# Patient Record
Sex: FEMALE | Race: OTHER | NOT HISPANIC OR LATINO
[De-identification: names, ages, dates, MRNs, and addresses within clinical notes are randomized per-mention and may not be internally consistent; named-entity substitution may affect disease eponyms.]

---

## 2017-01-04 ENCOUNTER — APPOINTMENT (OUTPATIENT)
Dept: ANTEPARTUM | Facility: CLINIC | Age: 32
End: 2017-01-04

## 2017-01-08 ENCOUNTER — EMERGENCY (EMERGENCY)
Facility: HOSPITAL | Age: 32
LOS: 0 days | Discharge: HOME | End: 2017-01-08
Attending: EMERGENCY MEDICINE
Payer: COMMERCIAL

## 2017-01-08 VITALS
DIASTOLIC BLOOD PRESSURE: 68 MMHG | HEIGHT: 63 IN | RESPIRATION RATE: 16 BRPM | TEMPERATURE: 99 F | OXYGEN SATURATION: 98 % | WEIGHT: 154.98 LBS | HEART RATE: 99 BPM | SYSTOLIC BLOOD PRESSURE: 121 MMHG

## 2017-01-08 DIAGNOSIS — J11.1 INFLUENZA DUE TO UNIDENTIFIED INFLUENZA VIRUS WITH OTHER RESPIRATORY MANIFESTATIONS: ICD-10-CM

## 2017-01-08 DIAGNOSIS — J06.9 ACUTE UPPER RESPIRATORY INFECTION, UNSPECIFIED: ICD-10-CM

## 2017-01-08 LAB
FLUAV SPEC QL CULT: NEGATIVE — SIGNIFICANT CHANGE UP
FLUBV AG SPEC QL IA: NEGATIVE — SIGNIFICANT CHANGE UP

## 2017-01-08 PROCEDURE — 99284 EMERGENCY DEPT VISIT MOD MDM: CPT

## 2017-01-08 NOTE — ED PROVIDER NOTE - PHYSICAL EXAMINATION
GEN: Alert, NAD  HEAD: atraumatic, EOMI, normal lids/conjunctiva  ENT: normal hearing, patent oropharynx without erythema/exudate, uvula midline  NECK: +supple, no tenderness/meningismus, +Trachea midline  PULM: Bilateral BS, normal resp effort, no wheeze/stridor/retractions  CV: RRR, no M/R/G, +dist ext pulses  ABD: soft, NT/ND  BACK: no CVAT, no midline tenderness  MSK: no edema/erythema/cyanosis  SKIN: no rash  NEURO: AAOx3, no sensory/motor deficits, CN 2-12 intact GEN: Alert, NAD  HEAD: atraumatic, EOMI, normal lids/conjunctiva  ENT: normal hearing, patent oropharynx without erythema/exudate, uvula midline  NECK: +supple, no tenderness/meningismus, +Trachea midline  PULM: Bilateral BS, normal resp effort, no wheeze/stridor/retractions  CV: RRR, no M/R/G, +dist ext pulses  ABD: soft, NT/ND  BACK: no CVAT, no midline tenderness  MSK: no edema/erythema/cyanosis  SKIN: no rash  NEURO: AAOx3, no sensory/motor deficits, CN 2-12 intact   on bedside US

## 2017-01-08 NOTE — ED PROVIDER NOTE - PRESENTING SYMPTOMS DETAILS
20 wks pregnant coming in w myalgias, dry cough, rhinorroea for past 2-3 days. n oabdpain/vb. sees ob/gyn tomorrow. n osick contacts, subjective fevers  ROS: No fever/chills, No photophobia/eye pain/changes in vision, No ear pain/sore throat/dysphagia, No chest pain/palpitations, no SOBwheeze/stridor, No abdominal pain/N/V/D, no dysuria/frequency/discharge, No neck/back pain, no rash, no changes in neurological status/function.

## 2017-01-08 NOTE — ED ADULT NURSE NOTE - OBJECTIVE STATEMENT
received ft c/o nasal congestion with fever, body aches and cough x few days denies chest pain or sob at present pt 20 weeks pregnant a0

## 2017-01-08 NOTE — ED PROVIDER NOTE - MEDICAL DECISION MAKING DETAILS
flu swab neg. pt appears well and non-toxic. . VSS. Sx have improved during ED stay. No acute events during ED observation period. Precautions given to return to the ED if sx persist or change. Pt expresses understanding and has no further questions. Pt feels comfortable and wishes to be discharged home. Instructed to follow up with PMD ob in 24 hrs.

## 2017-01-09 ENCOUNTER — ASOB RESULT (OUTPATIENT)
Age: 32
End: 2017-01-09

## 2017-01-09 ENCOUNTER — APPOINTMENT (OUTPATIENT)
Dept: ANTEPARTUM | Facility: CLINIC | Age: 32
End: 2017-01-09

## 2017-01-10 ENCOUNTER — APPOINTMENT (OUTPATIENT)
Dept: ANTEPARTUM | Facility: CLINIC | Age: 32
End: 2017-01-10

## 2017-03-18 ENCOUNTER — EMERGENCY (EMERGENCY)
Facility: HOSPITAL | Age: 32
LOS: 0 days | Discharge: HOME | End: 2017-03-18
Attending: EMERGENCY MEDICINE
Payer: COMMERCIAL

## 2017-03-18 VITALS
OXYGEN SATURATION: 100 % | WEIGHT: 164.91 LBS | HEART RATE: 79 BPM | HEIGHT: 64 IN | SYSTOLIC BLOOD PRESSURE: 110 MMHG | DIASTOLIC BLOOD PRESSURE: 67 MMHG | RESPIRATION RATE: 17 BRPM | TEMPERATURE: 98 F

## 2017-03-18 VITALS
RESPIRATION RATE: 18 BRPM | HEART RATE: 74 BPM | SYSTOLIC BLOOD PRESSURE: 103 MMHG | TEMPERATURE: 97 F | DIASTOLIC BLOOD PRESSURE: 61 MMHG

## 2017-03-18 DIAGNOSIS — Z04.3 ENCOUNTER FOR EXAMINATION AND OBSERVATION FOLLOWING OTHER ACCIDENT: ICD-10-CM
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
## 2017-03-18 DIAGNOSIS — Z33.1 PREGNANT STATE, INCIDENTAL: ICD-10-CM

## 2017-03-18 DIAGNOSIS — S80.02XA CONTUSION OF LEFT KNEE, INITIAL ENCOUNTER: ICD-10-CM

## 2017-03-18 DIAGNOSIS — Z91.040 LATEX ALLERGY STATUS: ICD-10-CM

## 2017-03-18 DIAGNOSIS — Z88.8 ALLERGY STATUS TO OTHER DRUGS, MEDICAMENTS AND BIOLOGICAL SUBSTANCES STATUS: ICD-10-CM

## 2017-03-18 DIAGNOSIS — S39.012A STRAIN OF MUSCLE, FASCIA AND TENDON OF LOWER BACK, INITIAL ENCOUNTER: ICD-10-CM
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
## 2017-03-18 PROCEDURE — 76856 US EXAM PELVIC COMPLETE: CPT | Mod: 26

## 2017-03-18 PROCEDURE — 99284 EMERGENCY DEPT VISIT MOD MDM: CPT

## 2017-03-18 RX ORDER — ACETAMINOPHEN 500 MG
650 TABLET ORAL ONCE
Qty: 0 | Refills: 0 | Status: COMPLETED | OUTPATIENT
Start: 2017-03-18 | End: 2017-03-18

## 2017-03-18 RX ADMIN — Medication 650 MILLIGRAM(S): at 17:31

## 2017-03-18 NOTE — ED PROVIDER NOTE - MUSCULOSKELETAL, MLM
Spine appears normal, range of motion is not limited, left sided knee with mild contusion, ecchymosis noted, ROM wnl, able to stand and walk, lumbar region with increased muscle tone. No midline C/T/L tenderness on spine.

## 2017-03-18 NOTE — ED PROVIDER NOTE - MEDICAL DECISION MAKING DETAILS
vertical lie, HR is 140s for fetus to dc with OB follow up, if abd pain or cramps develop to see OB/GYN or go to nearest OB facility.

## 2017-03-18 NOTE — ED ADULT TRIAGE NOTE - CHIEF COMPLAINT QUOTE
" I fell downs the stairs and if I am 7 months pregnant, I fell on my back into the side I don't think I hit my belly" denies vaginal bleeding, denies abdominal pain

## 2017-03-18 NOTE — ED PROVIDER NOTE - CARE PLAN
Principal Discharge DX:	Accidental fall  Secondary Diagnosis:	Knee contusion  Secondary Diagnosis:	Lumbosacral strain

## 2017-03-18 NOTE — ED PROVIDER NOTE - OBJECTIVE STATEMENT
31 year old female otherwise healthy presenting to ED due to left knee pain after slip and fall backwards on stairs, states some back pain noted, and left sided knee pain. Denies any vaginal bleeding or abd pain.

## 2017-05-31 ENCOUNTER — INPATIENT (INPATIENT)
Facility: HOSPITAL | Age: 32
LOS: 1 days | Discharge: HOME | End: 2017-06-02
Attending: OBSTETRICS & GYNECOLOGY | Admitting: OBSTETRICS & GYNECOLOGY

## 2017-05-31 ENCOUNTER — OUTPATIENT (OUTPATIENT)
Dept: OUTPATIENT SERVICES | Facility: HOSPITAL | Age: 32
LOS: 1 days | End: 2017-05-31
Payer: COMMERCIAL

## 2017-05-31 VITALS — WEIGHT: 176.37 LBS | HEIGHT: 63 IN

## 2017-05-31 DIAGNOSIS — O26.90 PREGNANCY RELATED CONDITIONS, UNSPECIFIED, UNSPECIFIED TRIMESTER: ICD-10-CM

## 2017-05-31 DIAGNOSIS — Z3A.00 WEEKS OF GESTATION OF PREGNANCY NOT SPECIFIED: ICD-10-CM

## 2017-05-31 DIAGNOSIS — O26.899 OTHER SPECIFIED PREGNANCY RELATED CONDITIONS, UNSPECIFIED TRIMESTER: ICD-10-CM

## 2017-05-31 LAB
BASOPHILS # BLD AUTO: 0.01 K/UL — SIGNIFICANT CHANGE UP (ref 0–0.2)
BASOPHILS NFR BLD AUTO: 0.1 % — SIGNIFICANT CHANGE UP (ref 0–2)
BLD GP AB SCN SERPL QL: NEGATIVE — SIGNIFICANT CHANGE UP
EOSINOPHIL # BLD AUTO: 0 K/UL — SIGNIFICANT CHANGE UP (ref 0–0.5)
EOSINOPHIL NFR BLD AUTO: 0 % — SIGNIFICANT CHANGE UP (ref 0–6)
HCT VFR BLD CALC: 36.8 % — SIGNIFICANT CHANGE UP (ref 34.5–45)
HGB BLD-MCNC: 12.3 G/DL — SIGNIFICANT CHANGE UP (ref 11.5–15.5)
IMM GRANULOCYTES NFR BLD AUTO: 0.3 % — SIGNIFICANT CHANGE UP (ref 0–1.5)
LYMPHOCYTES # BLD AUTO: 0.85 K/UL — LOW (ref 1–3.3)
LYMPHOCYTES # BLD AUTO: 6.8 % — LOW (ref 13–44)
MCHC RBC-ENTMCNC: 30.5 PG — SIGNIFICANT CHANGE UP (ref 27–34)
MCHC RBC-ENTMCNC: 33.4 % — SIGNIFICANT CHANGE UP (ref 32–36)
MCV RBC AUTO: 91.3 FL — SIGNIFICANT CHANGE UP (ref 80–100)
MONOCYTES # BLD AUTO: 0.22 K/UL — SIGNIFICANT CHANGE UP (ref 0–0.9)
MONOCYTES NFR BLD AUTO: 1.8 % — LOW (ref 2–14)
NEUTROPHILS # BLD AUTO: 11.44 K/UL — HIGH (ref 1.8–7.4)
NEUTROPHILS NFR BLD AUTO: 91 % — HIGH (ref 43–77)
PLATELET # BLD AUTO: 179 K/UL — SIGNIFICANT CHANGE UP (ref 150–400)
PMV BLD: 12.3 FL — SIGNIFICANT CHANGE UP (ref 7–13)
RBC # BLD: 4.03 M/UL — SIGNIFICANT CHANGE UP (ref 3.8–5.2)
RBC # FLD: 13.5 % — SIGNIFICANT CHANGE UP (ref 10.3–14.5)
RH IG SCN BLD-IMP: POSITIVE — SIGNIFICANT CHANGE UP
RH IG SCN BLD-IMP: POSITIVE — SIGNIFICANT CHANGE UP
T PALLIDUM AB TITR SER: NEGATIVE — SIGNIFICANT CHANGE UP
WBC # BLD: 12.56 K/UL — HIGH (ref 3.8–10.5)
WBC # FLD AUTO: 12.56 K/UL — HIGH (ref 3.8–10.5)

## 2017-05-31 PROCEDURE — 76818 FETAL BIOPHYS PROFILE W/NST: CPT | Mod: 26

## 2017-05-31 PROCEDURE — 99214 OFFICE O/P EST MOD 30 MIN: CPT | Mod: 25

## 2017-05-31 RX ORDER — LANOLIN
1 OINTMENT (GRAM) TOPICAL EVERY 6 HOURS
Qty: 0 | Refills: 0 | Status: DISCONTINUED | OUTPATIENT
Start: 2017-05-31 | End: 2017-06-02

## 2017-05-31 RX ORDER — AER TRAVELER 0.5 G/1
1 SOLUTION RECTAL; TOPICAL EVERY 4 HOURS
Qty: 0 | Refills: 0 | Status: DISCONTINUED | OUTPATIENT
Start: 2017-05-31 | End: 2017-06-02

## 2017-05-31 RX ORDER — PENICILLIN G POTASSIUM 5000000 [IU]/1
2.5 POWDER, FOR SOLUTION INTRAMUSCULAR; INTRAPLEURAL; INTRATHECAL; INTRAVENOUS EVERY 4 HOURS
Qty: 0 | Refills: 0 | Status: DISCONTINUED | OUTPATIENT
Start: 2017-05-31 | End: 2017-05-31

## 2017-05-31 RX ORDER — DOCUSATE SODIUM 100 MG
100 CAPSULE ORAL
Qty: 0 | Refills: 0 | Status: DISCONTINUED | OUTPATIENT
Start: 2017-05-31 | End: 2017-06-02

## 2017-05-31 RX ORDER — SODIUM CHLORIDE 9 MG/ML
1000 INJECTION, SOLUTION INTRAVENOUS
Qty: 0 | Refills: 0 | Status: DISCONTINUED | OUTPATIENT
Start: 2017-05-31 | End: 2017-05-31

## 2017-05-31 RX ORDER — ACETAMINOPHEN 500 MG
975 TABLET ORAL EVERY 6 HOURS
Qty: 0 | Refills: 0 | Status: COMPLETED | OUTPATIENT
Start: 2017-05-31 | End: 2018-04-29

## 2017-05-31 RX ORDER — DIBUCAINE 1 %
1 OINTMENT (GRAM) RECTAL EVERY 4 HOURS
Qty: 0 | Refills: 0 | Status: DISCONTINUED | OUTPATIENT
Start: 2017-05-31 | End: 2017-05-31

## 2017-05-31 RX ORDER — SODIUM CHLORIDE 9 MG/ML
1000 INJECTION, SOLUTION INTRAVENOUS ONCE
Qty: 0 | Refills: 0 | Status: COMPLETED | OUTPATIENT
Start: 2017-05-31 | End: 2017-05-31

## 2017-05-31 RX ORDER — OXYTOCIN 10 UNIT/ML
2 VIAL (ML) INJECTION
Qty: 30 | Refills: 0 | Status: DISCONTINUED | OUTPATIENT
Start: 2017-05-31 | End: 2017-05-31

## 2017-05-31 RX ORDER — ACETAMINOPHEN 500 MG
975 TABLET ORAL EVERY 6 HOURS
Qty: 0 | Refills: 0 | Status: DISCONTINUED | OUTPATIENT
Start: 2017-05-31 | End: 2017-06-02

## 2017-05-31 RX ORDER — HYDROCORTISONE 1 %
1 OINTMENT (GRAM) TOPICAL EVERY 4 HOURS
Qty: 0 | Refills: 0 | Status: DISCONTINUED | OUTPATIENT
Start: 2017-05-31 | End: 2017-05-31

## 2017-05-31 RX ORDER — MAGNESIUM HYDROXIDE 400 MG/1
30 TABLET, CHEWABLE ORAL
Qty: 0 | Refills: 0 | Status: DISCONTINUED | OUTPATIENT
Start: 2017-05-31 | End: 2017-06-02

## 2017-05-31 RX ORDER — PRAMOXINE HYDROCHLORIDE 150 MG/15G
1 AEROSOL, FOAM RECTAL EVERY 4 HOURS
Qty: 0 | Refills: 0 | Status: DISCONTINUED | OUTPATIENT
Start: 2017-05-31 | End: 2017-05-31

## 2017-05-31 RX ORDER — DIPHENHYDRAMINE HCL 50 MG
25 CAPSULE ORAL EVERY 6 HOURS
Qty: 0 | Refills: 0 | Status: DISCONTINUED | OUTPATIENT
Start: 2017-05-31 | End: 2017-06-02

## 2017-05-31 RX ORDER — SIMETHICONE 80 MG/1
80 TABLET, CHEWABLE ORAL EVERY 6 HOURS
Qty: 0 | Refills: 0 | Status: DISCONTINUED | OUTPATIENT
Start: 2017-05-31 | End: 2017-06-02

## 2017-05-31 RX ORDER — TETANUS TOXOID, REDUCED DIPHTHERIA TOXOID AND ACELLULAR PERTUSSIS VACCINE, ADSORBED 5; 2.5; 8; 8; 2.5 [IU]/.5ML; [IU]/.5ML; UG/.5ML; UG/.5ML; UG/.5ML
0.5 SUSPENSION INTRAMUSCULAR ONCE
Qty: 0 | Refills: 0 | Status: DISCONTINUED | OUTPATIENT
Start: 2017-05-31 | End: 2017-06-02

## 2017-05-31 RX ORDER — OXYCODONE HYDROCHLORIDE 5 MG/1
5 TABLET ORAL
Qty: 0 | Refills: 0 | Status: DISCONTINUED | OUTPATIENT
Start: 2017-05-31 | End: 2017-06-02

## 2017-05-31 RX ORDER — IBUPROFEN 200 MG
600 TABLET ORAL EVERY 6 HOURS
Qty: 0 | Refills: 0 | Status: COMPLETED | OUTPATIENT
Start: 2017-05-31 | End: 2018-04-29

## 2017-05-31 RX ORDER — KETOROLAC TROMETHAMINE 30 MG/ML
30 SYRINGE (ML) INJECTION ONCE
Qty: 0 | Refills: 0 | Status: DISCONTINUED | OUTPATIENT
Start: 2017-05-31 | End: 2017-05-31

## 2017-05-31 RX ORDER — ONDANSETRON 8 MG/1
4 TABLET, FILM COATED ORAL ONCE
Qty: 0 | Refills: 0 | Status: COMPLETED | OUTPATIENT
Start: 2017-05-31 | End: 2017-05-31

## 2017-05-31 RX ORDER — SODIUM CHLORIDE 9 MG/ML
3 INJECTION INTRAMUSCULAR; INTRAVENOUS; SUBCUTANEOUS EVERY 8 HOURS
Qty: 0 | Refills: 0 | Status: DISCONTINUED | OUTPATIENT
Start: 2017-05-31 | End: 2017-06-02

## 2017-05-31 RX ORDER — IBUPROFEN 200 MG
600 TABLET ORAL EVERY 6 HOURS
Qty: 0 | Refills: 0 | Status: DISCONTINUED | OUTPATIENT
Start: 2017-05-31 | End: 2017-06-02

## 2017-05-31 RX ORDER — PENICILLIN G POTASSIUM 5000000 [IU]/1
POWDER, FOR SOLUTION INTRAMUSCULAR; INTRAPLEURAL; INTRATHECAL; INTRAVENOUS
Qty: 0 | Refills: 0 | Status: DISCONTINUED | OUTPATIENT
Start: 2017-05-31 | End: 2017-05-31

## 2017-05-31 RX ORDER — SODIUM CHLORIDE 9 MG/ML
3 INJECTION INTRAMUSCULAR; INTRAVENOUS; SUBCUTANEOUS EVERY 8 HOURS
Qty: 0 | Refills: 0 | Status: DISCONTINUED | OUTPATIENT
Start: 2017-05-31 | End: 2017-05-31

## 2017-05-31 RX ORDER — GLYCERIN ADULT
1 SUPPOSITORY, RECTAL RECTAL AT BEDTIME
Qty: 0 | Refills: 0 | Status: DISCONTINUED | OUTPATIENT
Start: 2017-05-31 | End: 2017-06-02

## 2017-05-31 RX ORDER — PENICILLIN G POTASSIUM 5000000 [IU]/1
5 POWDER, FOR SOLUTION INTRAMUSCULAR; INTRAPLEURAL; INTRATHECAL; INTRAVENOUS ONCE
Qty: 0 | Refills: 0 | Status: COMPLETED | OUTPATIENT
Start: 2017-05-31 | End: 2017-05-31

## 2017-05-31 RX ORDER — OXYTOCIN 10 UNIT/ML
41.67 VIAL (ML) INJECTION
Qty: 20 | Refills: 0 | Status: DISCONTINUED | OUTPATIENT
Start: 2017-05-31 | End: 2017-05-31

## 2017-05-31 RX ORDER — OXYTOCIN 10 UNIT/ML
333.33 VIAL (ML) INJECTION
Qty: 20 | Refills: 0 | Status: DISCONTINUED | OUTPATIENT
Start: 2017-05-31 | End: 2017-05-31

## 2017-05-31 RX ORDER — HYDROCORTISONE 1 %
1 OINTMENT (GRAM) TOPICAL EVERY 4 HOURS
Qty: 0 | Refills: 0 | Status: DISCONTINUED | OUTPATIENT
Start: 2017-05-31 | End: 2017-06-02

## 2017-05-31 RX ORDER — DIBUCAINE 1 %
1 OINTMENT (GRAM) RECTAL EVERY 4 HOURS
Qty: 0 | Refills: 0 | Status: DISCONTINUED | OUTPATIENT
Start: 2017-05-31 | End: 2017-06-02

## 2017-05-31 RX ORDER — AER TRAVELER 0.5 G/1
1 SOLUTION RECTAL; TOPICAL EVERY 4 HOURS
Qty: 0 | Refills: 0 | Status: DISCONTINUED | OUTPATIENT
Start: 2017-05-31 | End: 2017-05-31

## 2017-05-31 RX ORDER — OXYCODONE HYDROCHLORIDE 5 MG/1
5 TABLET ORAL EVERY 4 HOURS
Qty: 0 | Refills: 0 | Status: DISCONTINUED | OUTPATIENT
Start: 2017-05-31 | End: 2017-06-02

## 2017-05-31 RX ORDER — PRAMOXINE HYDROCHLORIDE 150 MG/15G
1 AEROSOL, FOAM RECTAL EVERY 4 HOURS
Qty: 0 | Refills: 0 | Status: DISCONTINUED | OUTPATIENT
Start: 2017-05-31 | End: 2017-06-02

## 2017-05-31 RX ADMIN — PENICILLIN G POTASSIUM 200 MILLION UNIT(S): 5000000 POWDER, FOR SOLUTION INTRAMUSCULAR; INTRAPLEURAL; INTRATHECAL; INTRAVENOUS at 13:25

## 2017-05-31 RX ADMIN — PENICILLIN G POTASSIUM 200 MILLION UNIT(S): 5000000 POWDER, FOR SOLUTION INTRAMUSCULAR; INTRAPLEURAL; INTRATHECAL; INTRAVENOUS at 09:09

## 2017-05-31 RX ADMIN — Medication 0.2 MILLIGRAM(S): at 17:47

## 2017-05-31 RX ADMIN — Medication 125 MILLIUNIT(S)/MIN: at 17:49

## 2017-05-31 RX ADMIN — Medication 600 MILLIGRAM(S): at 22:45

## 2017-05-31 RX ADMIN — SODIUM CHLORIDE 125 MILLILITER(S): 9 INJECTION, SOLUTION INTRAVENOUS at 09:08

## 2017-05-31 RX ADMIN — Medication 30 MILLIGRAM(S): at 17:50

## 2017-05-31 RX ADMIN — SODIUM CHLORIDE 125 MILLILITER(S): 9 INJECTION, SOLUTION INTRAVENOUS at 17:07

## 2017-05-31 RX ADMIN — Medication 2 MILLIUNIT(S)/MIN: at 16:30

## 2017-05-31 RX ADMIN — Medication 30 MILLIGRAM(S): at 18:00

## 2017-05-31 RX ADMIN — SODIUM CHLORIDE 2000 MILLILITER(S): 9 INJECTION, SOLUTION INTRAVENOUS at 11:35

## 2017-05-31 RX ADMIN — Medication 600 MILLIGRAM(S): at 23:20

## 2017-05-31 RX ADMIN — ONDANSETRON 4 MILLIGRAM(S): 8 TABLET, FILM COATED ORAL at 17:50

## 2017-06-01 RX ADMIN — Medication 600 MILLIGRAM(S): at 12:39

## 2017-06-01 RX ADMIN — SODIUM CHLORIDE 3 MILLILITER(S): 9 INJECTION INTRAMUSCULAR; INTRAVENOUS; SUBCUTANEOUS at 05:14

## 2017-06-01 RX ADMIN — Medication 1 TABLET(S): at 12:39

## 2017-06-01 RX ADMIN — Medication 975 MILLIGRAM(S): at 22:38

## 2017-06-01 RX ADMIN — Medication 975 MILLIGRAM(S): at 02:44

## 2017-06-01 RX ADMIN — SODIUM CHLORIDE 3 MILLILITER(S): 9 INJECTION INTRAMUSCULAR; INTRAVENOUS; SUBCUTANEOUS at 00:41

## 2017-06-01 RX ADMIN — Medication 600 MILLIGRAM(S): at 22:38

## 2017-06-01 RX ADMIN — Medication 1 APPLICATION(S): at 22:37

## 2017-06-01 RX ADMIN — Medication 600 MILLIGRAM(S): at 18:02

## 2017-06-01 RX ADMIN — Medication 975 MILLIGRAM(S): at 09:53

## 2017-06-01 RX ADMIN — Medication 600 MILLIGRAM(S): at 18:34

## 2017-06-01 RX ADMIN — Medication 975 MILLIGRAM(S): at 10:15

## 2017-06-01 RX ADMIN — Medication 975 MILLIGRAM(S): at 23:00

## 2017-06-01 RX ADMIN — AER TRAVELER 1 APPLICATION(S): 0.5 SOLUTION RECTAL; TOPICAL at 22:38

## 2017-06-01 RX ADMIN — Medication 975 MILLIGRAM(S): at 15:00

## 2017-06-01 RX ADMIN — Medication 975 MILLIGRAM(S): at 14:30

## 2017-06-01 RX ADMIN — Medication 600 MILLIGRAM(S): at 13:27

## 2017-06-01 RX ADMIN — PRAMOXINE HYDROCHLORIDE 1 APPLICATION(S): 150 AEROSOL, FOAM RECTAL at 22:37

## 2017-06-01 RX ADMIN — Medication 975 MILLIGRAM(S): at 03:20

## 2017-06-01 RX ADMIN — Medication 600 MILLIGRAM(S): at 05:09

## 2017-06-01 RX ADMIN — Medication 600 MILLIGRAM(S): at 23:00

## 2017-06-01 RX ADMIN — Medication 600 MILLIGRAM(S): at 05:45

## 2017-06-01 NOTE — DISCHARGE NOTE OB - CARE PROVIDER_API CALL
Tom Osman), Obstetrics and Gynecology  [address] [place] [address]  Phone: [phone]  Fax: [phone]

## 2017-06-01 NOTE — PROVIDER CONTACT NOTE (OTHER) - ASSESSMENT
pt. OOB without difficulty. No c/o dizziness or lightheadedness.  Fundus firm, lochia is WNL.
Pt. verbalizes understanding.  No complaints offered at this time.  Continue to assess.

## 2017-06-01 NOTE — PROGRESS NOTE ADULT - SUBJECTIVE AND OBJECTIVE BOX
INTERVAL HPI/OVERNIGHT EVENTS: Pt seen and examined at bedside.  Pt complains of mild perineal discomfort. Voiding and ambulating without difficulty. Denies dizziness, lightheadedness, CP, SOB, palpitations.     MEDICATIONS  (STANDING):  sodium chloride 0.9% lock flush 3milliLiter(s) IV Push every 8 hours  diphtheria/tetanus/pertussis (acellular) Vaccine (ADAcel) 0.5milliLiter(s) IntraMuscular once  prenatal multivitamin 1Tablet(s) Oral daily  oxyCODONE IR 5milliGRAM(s) Oral every 3 hours  ibuprofen  Tablet 600milliGRAM(s) Oral every 6 hours  acetaminophen   Tablet. 975milliGRAM(s) Oral every 6 hours    MEDICATIONS  (PRN):  dibucaine 1% Ointment 1Application(s) Topical every 4 hours PRN Perineal Discomfort  lanolin Ointment 1Application(s) Topical every 6 hours PRN Sore Nipples  witch hazel Pads 1Application(s) Topical every 4 hours PRN Perineal Discomfort  simethicone 80milliGRAM(s) Chew every 6 hours PRN Gas  diphenhydrAMINE   Capsule 25milliGRAM(s) Oral every 6 hours PRN Itching  glycerin Suppository - Adult 1Suppository(s) Rectal at bedtime PRN Constipation  magnesium hydroxide Suspension 30milliLiter(s) Oral two times a day PRN Constipation  docusate sodium 100milliGRAM(s) Oral two times a day PRN Stool Softening  oxyCODONE IR 5milliGRAM(s) Oral every 4 hours PRN Severe Pain (7 -10)  hydrocortisone 1% Cream 1Application(s) Topical every 4 hours PRN perineal discomfort  pramoxine 1%/zinc 5% Cream 1Application(s) Topical every 4 hours PRN perineal discomfort      Vital Signs Last 24 Hrs  T(C): 36.6, Max: 37.3 (06-01 @ 01:37)  T(F): 97.9, Max: 99.1 (06-01 @ 01:37)  HR: 76 (69 - 85)  BP: 93/49 (93/49 - 106/49)  BP(mean): --  RR: 18 (16 - 19)  SpO2: 98% (97% - 100%)    PHYSICAL EXAM:    GA: NAD, A+0 x 3  Abd: ( + ) BS, soft, nontender, nondistended, no rebound or guarding,   Uterus: Fundus midline; firm    LABS:                        12.3   12.56 )-----------( 179      ( 31 May 2017 08:40 )             36.8                   RADIOLOGY & ADDITIONAL TESTS:

## 2017-06-01 NOTE — DISCHARGE NOTE OB - MEDICATION SUMMARY - MEDICATIONS TO TAKE
I will START or STAY ON the medications listed below when I get home from the hospital:    Tylenol 325 mg oral tablet  -- 2 tab(s) by mouth every 6 hours, As Needed for Pain  -- Indication: For Pain    Motrin  mg oral tablet  -- 2 tab(s) by mouth every 6 hours, As Needed for pain  -- Indication: For Pain

## 2017-06-01 NOTE — DISCHARGE NOTE OB - PLAN OF CARE
Return to usual daily living activities Regular Diet  Follow up in the office in 6 weeks. Call [phone] for an appointment.

## 2017-06-01 NOTE — PROGRESS NOTE ADULT - SUBJECTIVE AND OBJECTIVE BOX
AMPARO CONWAY   MRN# [id number]    SUBJECTIVE:    Pain: Controlled  Complaints: None    MILESTONES:   Alert and oriented x 3  [X  ]Out of bed /ambulating [ X ]  Voiding [X  ]  Due to void [  ]  Tolerating a regular diet [ X  ]  Infant feeding:   Breast [ x ]   Bottle [  ]     Both [  ]                         Feeding related issues or concerns: Reports of cluster feeding, increased fatigue. Educated patient     Objective   T(C): 36.6, Max: 37.3 (- @ 01:37)  HR: 76 (69 - 85)  BP: 93/49 (93/49 - 106/49)   Sue ESCOBEDO, notified recent blood pressure range 87/44, 87/53  RR: 18 (16 - 19)  SpO2: 98% (97% - 100%)    LABS:              12.3   12.56 )-----------( 179      ( 31 May 2017 08:40 )             36.8       Blood Type: A Positive  Treponema Pallidum Antibody Interpretation: Negative ( @ 08:40)    ASSESSMENT:   31y  y/o G  P     PPDay # 1    Delivery:   [X ]  Assisted delivery  :    Vacuum   [  ]   Forceps)  [  ]  Indication for assisted delivery: Tracing Abn [  ]     Maternal Exhaustion [  ]     Other [  ]  PAST MEDICAL & SURGICAL HISTORY:  No pertinent past medical history  No significant past surgical history    Current Issues:   Breasts: Soft [ X ]    Engorged [  ]  Abdomen: Soft [ X ]  Nontender [X  ]  Nondistended [ x ]   Distended [  ]                     Fundus firm [X  ]    Fundus boggy [   ]   Bowel sounds present [  ]  Bowel sounds absent  []   Vagina: Lochia  Mod [ x ]   Scant [  ]  Heavy [  ]  Perineum:  Intact [  ]   Laceration   [  ]   Type of laceration _____________,   Episiotomy [ x ]    Type of episiotomy _median______________  Lower extremities: Edema[ neg ]    Noted bilaterally [  ]       Non-tender calves [  ]      Negative Macrina's sign [  ]    Positive pedal pulses [  ]  Other relevant physical exam findings:      PLAN:  Plan: Increase ambulation, analgesia PRN and pain medication protocal standing oxycodone, ibuprofen and acetaminophen. Will notify  of recent blood pressure range. Patient notified there is a possibility of repeat CBC if  believes it is warranted  Diet: Continue regular diet  Continue routine postpartum care.     Discharge Planning [ x ]  Consults :  Social Work [  ]     Lactation [  ]    Other [   ] AMPARO CONWAY   MRN# [id number]    SUBJECTIVE:    Pain: Controlled  Complaints: None    MILESTONES:   Alert and oriented x 3  [X  ]Out of bed /ambulating [ X ]  Voiding [X  ]  Due to void [  ]  Tolerating a regular diet [ X  ]  Infant feeding:   Breast [ x ]   Bottle [  ]     Both [  ]                         Feeding related issues or concerns: Reports of cluster feeding, increased fatigue. Educated patient     Objective   T(C): 36.6, Max: 37.3 (- @ 01:37)  HR: 76 (69 - 85)  BP: 93/49 (93/49 - 106/49)   Sue ESCOBEDO, notified recent blood pressure range 87/44, 87/53  RR: 18 (16 - 19)  SpO2: 98% (97% - 100%)    LABS:              12.3   12.56 )-----------( 179      ( 31 May 2017 08:40 )             36.8       Blood Type: A Positive  Treponema Pallidum Antibody Interpretation: Negative ( @ 08:40)    ASSESSMENT:   31y  y/o G  P     PPDay # 1    Delivery:   [X ]  Assisted delivery  :    Vacuum   [x  ]   Forceps)  [  ]  Indication for assisted delivery: Tracing Abn [  ]     Maternal Exhaustion [  ]     Other [  ]  s/p Cytotec per rectum and methergine  PAST MEDICAL & SURGICAL HISTORY:  No pertinent past medical history    Current Issues:   Breasts: Soft [ X ]    Engorged [  ]  Abdomen: Soft [ X ]  Nontender [X  ]  Nondistended [ x ]   Distended [  ]                     Fundus firm [X  ]    Fundus boggy [   ]   Bowel sounds present [  ]  Bowel sounds absent  []   Vagina: Lochia  Mod [ x ]   Scant [  ]  Heavy [  ]  Perineum:  Intact [  ]   Laceration   [  ]   Type of laceration _____________,   Episiotomy [ x ]    Type of episiotomy _median______________  Lower extremities: Edema[ neg ]    Noted bilaterally [  ]       Non-tender calves [  ]      Negative Macrina's sign [  ]    Positive pedal pulses [  ]  Other relevant physical exam findings:      PLAN:  Plan: Increase ambulation, analgesia PRN and pain medication protocal standing oxycodone, ibuprofen and acetaminophen. Will notify  of recent blood pressure range. Patient notified there is a possibility of repeat CBC if  believes it is warranted  Diet: Continue regular diet  Continue routine postpartum care.     Discharge Planning [ x ]  Consults :  Social Work [  ]     Lactation [  ]    Other [   ]

## 2017-06-01 NOTE — LACTATION INITIAL EVALUATION - INTERVENTION OUTCOME
nbn demonstrated  deep latch and  performed  with sucking and swallowing  noted   pt instructed to notify pediatrician if nbn not feeding 8-12 times/24 hours and not voiding and stooling according to breastfeeding log.  ,/verbalizes understanding

## 2017-06-01 NOTE — LACTATION INITIAL EVALUATION - LACTATION INTERVENTIONS
initiate skin to skin/assisted with deep latch and positioning  discussed  signs  of  effective  feeding and  swallowing.  discussed  compression at  breast when  nbn  stops  drinking  and  is  still sucking.   ,  discussed  strategies  to  keep  nbn  awake/initiate hand expression routine

## 2017-06-01 NOTE — PROVIDER CONTACT NOTE (OTHER) - SITUATION
Pt's diastolic bp outside of parameter, <50, HR in the 70's. Pt. denies any HA, chest pain, blurry vision, epigastric pain, palpitations, or SOB.  Moderate vaginal bleeding noted.

## 2017-06-01 NOTE — DISCHARGE NOTE OB - CARE PLAN
Principal Discharge DX:	Vaginal delivery  Goal:	Return to usual daily living activities  Instructions for follow-up, activity and diet:	Regular Diet  Follow up in the office in 6 weeks. Call [phone] for an appointment. Principal Discharge DX:	Vaginal delivery  Goal:	Return to usual daily living activities  Instructions for follow-up, activity and diet:	Regular Diet  Follow up in the office in 6 weeks. Call [phone] for an appointment. Principal Discharge DX:	Vaginal delivery  Goal:	Return to usual daily living activities  Instructions for follow-up, activity and diet:	Regular Diet  Follow up in the office in 6 weeks. Call [phone] for an appointment.

## 2017-06-01 NOTE — PROGRESS NOTE ADULT - SUBJECTIVE AND OBJECTIVE BOX
Patient seen and examined at bedside, no acute overnight events. No acute complaints, pain well controlled.  Patient is ambulating, voiding spontaneously, and tolerating regular diet. Patient has not yet passed flatus. Pt is breastfeeding her baby.    Vital Signs Last 24 Hours  T(C): 36.6, Max: 37.3 ( @ 01:37)  HR: 76 (69 - 85)  BP: 93/49 (93/49 - 106/49)  RR: 18 (16 - 19)  SpO2: 98% (97% - 100%)  Wt(kg): --    Physical Exam:  General: NAD  Abdomen: Soft, non-tender, non-distended, fundus firm  Pelvic: Lochia wnl  Ext: NTBL  Labs:  Blood type: A Positive  Rubella IgG: RPR: Negative                          12.3   12.56<H> >-----------< 179    (  @ 08:40 )             36.8      AP 32 yo  PPD1 sp uncomplicated VAVD meeting all appropriate milestones  Increase ambulation  analgesia as needed  routine care

## 2017-06-01 NOTE — PROVIDER CONTACT NOTE (OTHER) - BACKGROUND
Zoë Tipton notified and made aware at that time. pt instructed to increase po hydration. Pt. instructed to notify RN of any changes or appearance of symptoms in above status.

## 2017-06-01 NOTE — DISCHARGE NOTE OB - PATIENT PORTAL LINK FT
“You can access the Follow[employer] Patient Portal, offered by [facility], by registering with the following website: [email or web address]”

## 2017-06-01 NOTE — PROVIDER CONTACT NOTE (OTHER) - ACTION/TREATMENT ORDERED:
Kim Dumont NP notified of low BP's and in to see pt.  Continue to observe and instructed pt. not to get OOB if dizzy or lightheaded.  pt. verbalized understanding.

## 2017-06-01 NOTE — DISCHARGE NOTE OB - MATERIALS PROVIDED
Shaken Baby Prevention Handout/Vaccinations/Birth Certificate Instructions/[facility] Hearing Screen Program/Breastfeeding Log/[facility]  Screening Program/Guide to Postpartum Care

## 2017-06-02 VITALS
TEMPERATURE: 98 F | OXYGEN SATURATION: 100 % | HEART RATE: 61 BPM | RESPIRATION RATE: 18 BRPM | DIASTOLIC BLOOD PRESSURE: 57 MMHG | SYSTOLIC BLOOD PRESSURE: 100 MMHG

## 2017-06-02 DIAGNOSIS — O26.899 OTHER SPECIFIED PREGNANCY RELATED CONDITIONS, UNSPECIFIED TRIMESTER: ICD-10-CM

## 2017-06-02 RX ADMIN — Medication 975 MILLIGRAM(S): at 05:00

## 2017-06-02 RX ADMIN — Medication 975 MILLIGRAM(S): at 04:20

## 2017-06-02 RX ADMIN — Medication 600 MILLIGRAM(S): at 05:00

## 2017-06-02 RX ADMIN — Medication 600 MILLIGRAM(S): at 04:20

## 2018-05-24 ENCOUNTER — APPOINTMENT (OUTPATIENT)
Dept: INTERNAL MEDICINE | Facility: CLINIC | Age: 33
End: 2018-05-24

## 2018-07-21 ENCOUNTER — EMERGENCY (EMERGENCY)
Facility: HOSPITAL | Age: 33
LOS: 0 days | Discharge: HOME | End: 2018-07-21
Attending: STUDENT IN AN ORGANIZED HEALTH CARE EDUCATION/TRAINING PROGRAM
Payer: COMMERCIAL

## 2018-07-21 VITALS
SYSTOLIC BLOOD PRESSURE: 103 MMHG | TEMPERATURE: 99 F | HEART RATE: 100 BPM | DIASTOLIC BLOOD PRESSURE: 64 MMHG | OXYGEN SATURATION: 100 % | RESPIRATION RATE: 18 BRPM

## 2018-07-21 VITALS
RESPIRATION RATE: 19 BRPM | TEMPERATURE: 99 F | SYSTOLIC BLOOD PRESSURE: 114 MMHG | OXYGEN SATURATION: 95 % | DIASTOLIC BLOOD PRESSURE: 59 MMHG | HEART RATE: 101 BPM | HEIGHT: 63 IN | WEIGHT: 141.98 LBS

## 2018-07-21 DIAGNOSIS — R05 COUGH: ICD-10-CM

## 2018-07-21 DIAGNOSIS — R10.30 LOWER ABDOMINAL PAIN, UNSPECIFIED: ICD-10-CM

## 2018-07-21 DIAGNOSIS — R50.9 FEVER, UNSPECIFIED: ICD-10-CM

## 2018-07-21 DIAGNOSIS — Z3A.01 LESS THAN 8 WEEKS GESTATION OF PREGNANCY: ICD-10-CM

## 2018-07-21 DIAGNOSIS — O23.41 UNSPECIFIED INFECTION OF URINARY TRACT IN PREGNANCY, FIRST TRIMESTER: ICD-10-CM

## 2018-07-21 LAB
ALBUMIN SERPL ELPH-MCNC: 3.6 G/DL — SIGNIFICANT CHANGE UP (ref 3.3–5)
ALP SERPL-CCNC: 44 U/L — SIGNIFICANT CHANGE UP (ref 40–120)
ALT FLD-CCNC: 17 U/L — SIGNIFICANT CHANGE UP (ref 12–78)
AMYLASE P1 CFR SERPL: 29 U/L — SIGNIFICANT CHANGE UP (ref 25–115)
ANION GAP SERPL CALC-SCNC: 9 MMOL/L — SIGNIFICANT CHANGE UP (ref 5–17)
APPEARANCE UR: CLEAR — SIGNIFICANT CHANGE UP
AST SERPL-CCNC: 10 U/L — LOW (ref 15–37)
BACTERIA # UR AUTO: ABNORMAL
BASOPHILS # BLD AUTO: 0.04 K/UL — SIGNIFICANT CHANGE UP (ref 0–0.2)
BASOPHILS NFR BLD AUTO: 0.3 % — SIGNIFICANT CHANGE UP (ref 0–2)
BILIRUB DIRECT SERPL-MCNC: 0.15 MG/DL — SIGNIFICANT CHANGE UP (ref 0.05–0.2)
BILIRUB INDIRECT FLD-MCNC: 0.4 MG/DL — SIGNIFICANT CHANGE UP (ref 0.2–1)
BILIRUB SERPL-MCNC: 0.6 MG/DL — SIGNIFICANT CHANGE UP (ref 0.2–1.2)
BILIRUB UR-MCNC: NEGATIVE — SIGNIFICANT CHANGE UP
BUN SERPL-MCNC: 7 MG/DL — SIGNIFICANT CHANGE UP (ref 7–23)
CALCIUM SERPL-MCNC: 8.7 MG/DL — SIGNIFICANT CHANGE UP (ref 8.5–10.1)
CHLORIDE SERPL-SCNC: 109 MMOL/L — HIGH (ref 96–108)
CO2 SERPL-SCNC: 23 MMOL/L — SIGNIFICANT CHANGE UP (ref 22–31)
COLOR SPEC: YELLOW — SIGNIFICANT CHANGE UP
CREAT SERPL-MCNC: 0.67 MG/DL — SIGNIFICANT CHANGE UP (ref 0.5–1.3)
DIFF PNL FLD: NEGATIVE — SIGNIFICANT CHANGE UP
EOSINOPHIL # BLD AUTO: 0.01 K/UL — SIGNIFICANT CHANGE UP (ref 0–0.5)
EOSINOPHIL NFR BLD AUTO: 0.1 % — SIGNIFICANT CHANGE UP (ref 0–6)
EPI CELLS # UR: ABNORMAL
GLUCOSE SERPL-MCNC: 103 MG/DL — HIGH (ref 70–99)
GLUCOSE UR QL: NEGATIVE MG/DL — SIGNIFICANT CHANGE UP
HCG SERPL-ACNC: 445 MIU/ML — HIGH
HCT VFR BLD CALC: 38.6 % — SIGNIFICANT CHANGE UP (ref 34.5–45)
HGB BLD-MCNC: 13.1 G/DL — SIGNIFICANT CHANGE UP (ref 11.5–15.5)
IMM GRANULOCYTES NFR BLD AUTO: 0.5 % — SIGNIFICANT CHANGE UP (ref 0–1.5)
KETONES UR-MCNC: NEGATIVE — SIGNIFICANT CHANGE UP
LEUKOCYTE ESTERASE UR-ACNC: ABNORMAL
LIDOCAIN IGE QN: 121 U/L — SIGNIFICANT CHANGE UP (ref 73–393)
LYMPHOCYTES # BLD AUTO: 0.72 K/UL — LOW (ref 1–3.3)
LYMPHOCYTES # BLD AUTO: 4.9 % — LOW (ref 13–44)
MCHC RBC-ENTMCNC: 31.2 PG — SIGNIFICANT CHANGE UP (ref 27–34)
MCHC RBC-ENTMCNC: 33.9 GM/DL — SIGNIFICANT CHANGE UP (ref 32–36)
MCV RBC AUTO: 91.9 FL — SIGNIFICANT CHANGE UP (ref 80–100)
MONOCYTES # BLD AUTO: 0.57 K/UL — SIGNIFICANT CHANGE UP (ref 0–0.9)
MONOCYTES NFR BLD AUTO: 3.9 % — SIGNIFICANT CHANGE UP (ref 2–14)
NEUTROPHILS # BLD AUTO: 13.39 K/UL — HIGH (ref 1.8–7.4)
NEUTROPHILS NFR BLD AUTO: 90.3 % — HIGH (ref 43–77)
NITRITE UR-MCNC: NEGATIVE — SIGNIFICANT CHANGE UP
NRBC # BLD: 0 /100 WBCS — SIGNIFICANT CHANGE UP (ref 0–0)
PH UR: 6 — SIGNIFICANT CHANGE UP (ref 5–8)
PLATELET # BLD AUTO: 176 K/UL — SIGNIFICANT CHANGE UP (ref 150–400)
POTASSIUM SERPL-MCNC: 3.6 MMOL/L — SIGNIFICANT CHANGE UP (ref 3.5–5.3)
POTASSIUM SERPL-SCNC: 3.6 MMOL/L — SIGNIFICANT CHANGE UP (ref 3.5–5.3)
PROT SERPL-MCNC: 7.3 GM/DL — SIGNIFICANT CHANGE UP (ref 6–8.3)
PROT UR-MCNC: 15 MG/DL
RBC # BLD: 4.2 M/UL — SIGNIFICANT CHANGE UP (ref 3.8–5.2)
RBC # FLD: 12.2 % — SIGNIFICANT CHANGE UP (ref 10.3–14.5)
RBC CASTS # UR COMP ASSIST: SIGNIFICANT CHANGE UP /HPF (ref 0–4)
SODIUM SERPL-SCNC: 141 MMOL/L — SIGNIFICANT CHANGE UP (ref 135–145)
SP GR SPEC: 1 — LOW (ref 1.01–1.02)
UROBILINOGEN FLD QL: NEGATIVE MG/DL — SIGNIFICANT CHANGE UP
WBC # BLD: 14.8 K/UL — HIGH (ref 3.8–10.5)
WBC # FLD AUTO: 14.8 K/UL — HIGH (ref 3.8–10.5)
WBC UR QL: ABNORMAL

## 2018-07-21 PROCEDURE — 76856 US EXAM PELVIC COMPLETE: CPT | Mod: 26

## 2018-07-21 PROCEDURE — 99284 EMERGENCY DEPT VISIT MOD MDM: CPT | Mod: 25

## 2018-07-21 RX ORDER — SODIUM CHLORIDE 9 MG/ML
1000 INJECTION INTRAMUSCULAR; INTRAVENOUS; SUBCUTANEOUS ONCE
Qty: 0 | Refills: 0 | Status: COMPLETED | OUTPATIENT
Start: 2018-07-21 | End: 2018-07-21

## 2018-07-21 RX ORDER — NITROFURANTOIN MACROCRYSTAL 50 MG
100 CAPSULE ORAL ONCE
Qty: 0 | Refills: 0 | Status: COMPLETED | OUTPATIENT
Start: 2018-07-21 | End: 2018-07-21

## 2018-07-21 RX ORDER — NITROFURANTOIN MACROCRYSTAL 50 MG
1 CAPSULE ORAL
Qty: 14 | Refills: 0
Start: 2018-07-21 | End: 2018-07-27

## 2018-07-21 RX ORDER — ACETAMINOPHEN 500 MG
650 TABLET ORAL ONCE
Qty: 0 | Refills: 0 | Status: COMPLETED | OUTPATIENT
Start: 2018-07-21 | End: 2018-07-21

## 2018-07-21 RX ADMIN — SODIUM CHLORIDE 1000 MILLILITER(S): 9 INJECTION INTRAMUSCULAR; INTRAVENOUS; SUBCUTANEOUS at 09:03

## 2018-07-21 RX ADMIN — Medication 100 MILLIGRAM(S): at 11:34

## 2018-07-21 RX ADMIN — Medication 650 MILLIGRAM(S): at 11:34

## 2018-07-21 RX ADMIN — SODIUM CHLORIDE 1000 MILLILITER(S): 9 INJECTION INTRAMUSCULAR; INTRAVENOUS; SUBCUTANEOUS at 11:34

## 2018-07-21 RX ADMIN — Medication 650 MILLIGRAM(S): at 12:05

## 2018-07-21 NOTE — ED ADULT TRIAGE NOTE - CHIEF COMPLAINT QUOTE
patient c/o of fever with cough and sore throat started yesterday , patient c/o of abdominal cramping , denied vaginal discharge no vaginal bleeding patient pregnant

## 2018-07-21 NOTE — ED PROVIDER NOTE - OBJECTIVE STATEMENT
33 year old female presents today c/o fever of 101 this morning with mild cough (+) sore throat (-) ear pain (-) nausea or vomiting +lower abdominal cramping, pt states that she is pregnant but denies vaginal bleeding (LMP-7/27/18)

## 2018-07-21 NOTE — ED PROVIDER NOTE - PROGRESS NOTE DETAILS
pt signed out to me from dr gaitan, pt presented with hematuria and chest pain, pt has a h/o hemorrhoidal bleed and unsure if she had rectal bleeding, guaiac is pending guaiac is negative, ua shows a uti, rocephin ordered pt feels better, pt and her daughter who is at the bedside were asked if she was ever told to have a pericardial effusion, they are not aware however, she does have a cardiologist who her daughter will have her follow up with, her daughter will fax the ct report to her cardiologist on monday, the pt denies sob, does not appear in distress

## 2018-07-21 NOTE — ED PROVIDER NOTE - MEDICAL DECISION MAKING DETAILS
labs, ivf, sono, tylenol, macrobid labs, ivf, sono, tylenol, macrobid, pt told to follow up with her gynecologist next week for repeat hormone level and sono

## 2018-07-21 NOTE — ED PROVIDER NOTE - CARE PLAN
Principal Discharge DX:	UTI (urinary tract infection) during pregnancy  Secondary Diagnosis:	Pregnancy at early stage

## 2018-07-22 LAB
CULTURE RESULTS: SIGNIFICANT CHANGE UP
SPECIMEN SOURCE: SIGNIFICANT CHANGE UP

## 2018-07-24 ENCOUNTER — EMERGENCY (EMERGENCY)
Facility: HOSPITAL | Age: 33
LOS: 0 days | Discharge: HOME | End: 2018-07-24
Attending: EMERGENCY MEDICINE
Payer: COMMERCIAL

## 2018-07-24 VITALS
OXYGEN SATURATION: 99 % | HEART RATE: 103 BPM | HEIGHT: 63 IN | WEIGHT: 143.08 LBS | DIASTOLIC BLOOD PRESSURE: 61 MMHG | RESPIRATION RATE: 19 BRPM | SYSTOLIC BLOOD PRESSURE: 130 MMHG | TEMPERATURE: 99 F

## 2018-07-24 VITALS
RESPIRATION RATE: 20 BRPM | HEART RATE: 92 BPM | SYSTOLIC BLOOD PRESSURE: 105 MMHG | TEMPERATURE: 99 F | DIASTOLIC BLOOD PRESSURE: 57 MMHG | OXYGEN SATURATION: 100 %

## 2018-07-24 DIAGNOSIS — Z91.040 LATEX ALLERGY STATUS: ICD-10-CM

## 2018-07-24 DIAGNOSIS — O99.511 DISEASES OF THE RESPIRATORY SYSTEM COMPLICATING PREGNANCY, FIRST TRIMESTER: ICD-10-CM

## 2018-07-24 DIAGNOSIS — R50.9 FEVER, UNSPECIFIED: ICD-10-CM

## 2018-07-24 DIAGNOSIS — R51 HEADACHE: ICD-10-CM

## 2018-07-24 DIAGNOSIS — J32.9 CHRONIC SINUSITIS, UNSPECIFIED: ICD-10-CM

## 2018-07-24 DIAGNOSIS — Z88.9 ALLERGY STATUS TO UNSPECIFIED DRUGS, MEDICAMENTS AND BIOLOGICAL SUBSTANCES: ICD-10-CM

## 2018-07-24 DIAGNOSIS — R09.81 NASAL CONGESTION: ICD-10-CM

## 2018-07-24 LAB
ALBUMIN SERPL ELPH-MCNC: 3.5 G/DL — SIGNIFICANT CHANGE UP (ref 3.3–5)
ALP SERPL-CCNC: 47 U/L — SIGNIFICANT CHANGE UP (ref 40–120)
ALT FLD-CCNC: 22 U/L — SIGNIFICANT CHANGE UP (ref 12–78)
ANION GAP SERPL CALC-SCNC: 9 MMOL/L — SIGNIFICANT CHANGE UP (ref 5–17)
APPEARANCE UR: CLEAR — SIGNIFICANT CHANGE UP
AST SERPL-CCNC: 15 U/L — SIGNIFICANT CHANGE UP (ref 15–37)
BASOPHILS # BLD AUTO: 0.04 K/UL — SIGNIFICANT CHANGE UP (ref 0–0.2)
BASOPHILS NFR BLD AUTO: 0.3 % — SIGNIFICANT CHANGE UP (ref 0–2)
BILIRUB SERPL-MCNC: 0.5 MG/DL — SIGNIFICANT CHANGE UP (ref 0.2–1.2)
BILIRUB UR-MCNC: NEGATIVE — SIGNIFICANT CHANGE UP
BLD GP AB SCN SERPL QL: SIGNIFICANT CHANGE UP
BUN SERPL-MCNC: 4 MG/DL — LOW (ref 7–23)
CALCIUM SERPL-MCNC: 9 MG/DL — SIGNIFICANT CHANGE UP (ref 8.5–10.1)
CHLORIDE SERPL-SCNC: 104 MMOL/L — SIGNIFICANT CHANGE UP (ref 96–108)
CO2 SERPL-SCNC: 28 MMOL/L — SIGNIFICANT CHANGE UP (ref 22–31)
COLOR SPEC: YELLOW — SIGNIFICANT CHANGE UP
CREAT SERPL-MCNC: 0.58 MG/DL — SIGNIFICANT CHANGE UP (ref 0.5–1.3)
DIFF PNL FLD: NEGATIVE — SIGNIFICANT CHANGE UP
EOSINOPHIL # BLD AUTO: 0.09 K/UL — SIGNIFICANT CHANGE UP (ref 0–0.5)
EOSINOPHIL NFR BLD AUTO: 0.8 % — SIGNIFICANT CHANGE UP (ref 0–6)
EPI CELLS # UR: SIGNIFICANT CHANGE UP
GLUCOSE SERPL-MCNC: 93 MG/DL — SIGNIFICANT CHANGE UP (ref 70–99)
GLUCOSE UR QL: NEGATIVE MG/DL — SIGNIFICANT CHANGE UP
HCG SERPL-ACNC: 1967 MIU/ML — HIGH
HCT VFR BLD CALC: 38.7 % — SIGNIFICANT CHANGE UP (ref 34.5–45)
HGB BLD-MCNC: 13.3 G/DL — SIGNIFICANT CHANGE UP (ref 11.5–15.5)
IMM GRANULOCYTES NFR BLD AUTO: 0.4 % — SIGNIFICANT CHANGE UP (ref 0–1.5)
INR BLD: 1.17 RATIO — HIGH (ref 0.88–1.16)
KETONES UR-MCNC: ABNORMAL
LEUKOCYTE ESTERASE UR-ACNC: NEGATIVE — SIGNIFICANT CHANGE UP
LYMPHOCYTES # BLD AUTO: 1.23 K/UL — SIGNIFICANT CHANGE UP (ref 1–3.3)
LYMPHOCYTES # BLD AUTO: 10.5 % — LOW (ref 13–44)
MCHC RBC-ENTMCNC: 31.2 PG — SIGNIFICANT CHANGE UP (ref 27–34)
MCHC RBC-ENTMCNC: 34.4 GM/DL — SIGNIFICANT CHANGE UP (ref 32–36)
MCV RBC AUTO: 90.8 FL — SIGNIFICANT CHANGE UP (ref 80–100)
MONOCYTES # BLD AUTO: 0.67 K/UL — SIGNIFICANT CHANGE UP (ref 0–0.9)
MONOCYTES NFR BLD AUTO: 5.7 % — SIGNIFICANT CHANGE UP (ref 2–14)
NEUTROPHILS # BLD AUTO: 9.59 K/UL — HIGH (ref 1.8–7.4)
NEUTROPHILS NFR BLD AUTO: 82.3 % — HIGH (ref 43–77)
NITRITE UR-MCNC: NEGATIVE — SIGNIFICANT CHANGE UP
NRBC # BLD: 0 /100 WBCS — SIGNIFICANT CHANGE UP (ref 0–0)
PH UR: 7 — SIGNIFICANT CHANGE UP (ref 5–8)
PLATELET # BLD AUTO: 220 K/UL — SIGNIFICANT CHANGE UP (ref 150–400)
POTASSIUM SERPL-MCNC: 3.9 MMOL/L — SIGNIFICANT CHANGE UP (ref 3.5–5.3)
POTASSIUM SERPL-SCNC: 3.9 MMOL/L — SIGNIFICANT CHANGE UP (ref 3.5–5.3)
PROT SERPL-MCNC: 7.8 GM/DL — SIGNIFICANT CHANGE UP (ref 6–8.3)
PROT UR-MCNC: NEGATIVE MG/DL — SIGNIFICANT CHANGE UP
PROTHROM AB SERPL-ACNC: 12.8 SEC — HIGH (ref 9.8–12.7)
RBC # BLD: 4.26 M/UL — SIGNIFICANT CHANGE UP (ref 3.8–5.2)
RBC # FLD: 12 % — SIGNIFICANT CHANGE UP (ref 10.3–14.5)
SODIUM SERPL-SCNC: 141 MMOL/L — SIGNIFICANT CHANGE UP (ref 135–145)
SP GR SPEC: 1 — LOW (ref 1.01–1.02)
UROBILINOGEN FLD QL: NEGATIVE MG/DL — SIGNIFICANT CHANGE UP
WBC # BLD: 11.67 K/UL — HIGH (ref 3.8–10.5)
WBC # FLD AUTO: 11.67 K/UL — HIGH (ref 3.8–10.5)
WBC UR QL: SIGNIFICANT CHANGE UP

## 2018-07-24 PROCEDURE — 99284 EMERGENCY DEPT VISIT MOD MDM: CPT

## 2018-07-24 RX ORDER — SODIUM CHLORIDE 9 MG/ML
1000 INJECTION INTRAMUSCULAR; INTRAVENOUS; SUBCUTANEOUS ONCE
Qty: 0 | Refills: 0 | Status: COMPLETED | OUTPATIENT
Start: 2018-07-24 | End: 2018-07-24

## 2018-07-24 RX ORDER — ACETAMINOPHEN 500 MG
975 TABLET ORAL ONCE
Qty: 0 | Refills: 0 | Status: COMPLETED | OUTPATIENT
Start: 2018-07-24 | End: 2018-07-24

## 2018-07-24 RX ORDER — METOCLOPRAMIDE HCL 10 MG
10 TABLET ORAL ONCE
Qty: 0 | Refills: 0 | Status: COMPLETED | OUTPATIENT
Start: 2018-07-24 | End: 2018-07-24

## 2018-07-24 RX ADMIN — Medication 975 MILLIGRAM(S): at 17:25

## 2018-07-24 RX ADMIN — SODIUM CHLORIDE 1000 MILLILITER(S): 9 INJECTION INTRAMUSCULAR; INTRAVENOUS; SUBCUTANEOUS at 19:17

## 2018-07-24 RX ADMIN — SODIUM CHLORIDE 1000 MILLILITER(S): 9 INJECTION INTRAMUSCULAR; INTRAVENOUS; SUBCUTANEOUS at 17:25

## 2018-07-24 RX ADMIN — Medication 10 MILLIGRAM(S): at 17:25

## 2018-07-24 NOTE — ED PROVIDER NOTE - OBJECTIVE STATEMENT
33 years old male here with her  c/o severe nasal congestions, headache, chills, fever, sore throat for 4 days. Pt sts she is pregnant and last menstrual cycle was 18 . Pt was here 3 days ago was diagnosed with uti and discharged with macrobid. Pt denies recent hx of trauma, dizziness, blurred visions, light sensitivities, diplopia, focal/distal weakness or numbness, cough, sob, chest pain, nausea, vomiting, abd pain, dysuria, hematuria, vaginal spotting or discharge or irregular bowel movements.

## 2018-07-24 NOTE — ED PROVIDER NOTE - CONSTITUTIONAL, MLM
normal... Well appearing, well nourished, awake, alert, oriented to person, place, time/situation and in no apparent distress. Speaking in clear full sentences no nasal flaring no shoulders retractions not holding her head/chest/abdomen, appears comfortable sitting up in the stretcher in a bright light room.

## 2018-07-24 NOTE — ED PROVIDER NOTE - PROGRESS NOTE DETAILS
Pt is alert and oriented x 3 smiling while sitting up tolerating dinner at bed side. Pt sts her headache is completely resolved. Pt is ambulating with normal gaits without assists. Pt and her  are given and explained all test reports and advised to follow up with her ob/gyn and continue to finish the her course of oral antibiotic. Pt's beta hormone level is more than doubled compare to three days ago.

## 2019-03-28 ENCOUNTER — ASOB RESULT (OUTPATIENT)
Age: 34
End: 2019-03-28

## 2019-03-28 ENCOUNTER — APPOINTMENT (OUTPATIENT)
Dept: ANTEPARTUM | Facility: CLINIC | Age: 34
End: 2019-03-28
Payer: COMMERCIAL

## 2019-03-28 PROCEDURE — 76813 OB US NUCHAL MEAS 1 GEST: CPT

## 2019-03-28 PROCEDURE — 36416 COLLJ CAPILLARY BLOOD SPEC: CPT

## 2019-03-28 PROCEDURE — 76801 OB US < 14 WKS SINGLE FETUS: CPT

## 2019-05-28 ENCOUNTER — ASOB RESULT (OUTPATIENT)
Age: 34
End: 2019-05-28

## 2019-05-28 ENCOUNTER — APPOINTMENT (OUTPATIENT)
Dept: ANTEPARTUM | Facility: CLINIC | Age: 34
End: 2019-05-28
Payer: COMMERCIAL

## 2019-05-28 PROCEDURE — 76811 OB US DETAILED SNGL FETUS: CPT

## 2019-06-06 NOTE — DISCHARGE NOTE OB - USE WARM WATER SITZ BATH FOR RELIEF OF DISCOMFORT
[de-identified] : This is a 75-year-old patient of with a history of atrial fibrillation which is being treated with the Coumadin therapy. In addition he has a history of hypertension and cardiac arrhythmias. In addition he has a history of prostatic carcinoma and of concern is this progressive slow rise in his PSA level he did see Dr. Martinez who reassured him about this rise and he will be seeing him shortly. Statement Selected

## 2019-07-23 ENCOUNTER — ASOB RESULT (OUTPATIENT)
Age: 34
End: 2019-07-23

## 2019-07-23 ENCOUNTER — APPOINTMENT (OUTPATIENT)
Dept: ANTEPARTUM | Facility: CLINIC | Age: 34
End: 2019-07-23
Payer: COMMERCIAL

## 2019-07-23 PROCEDURE — 76817 TRANSVAGINAL US OBSTETRIC: CPT

## 2019-07-23 PROCEDURE — 76819 FETAL BIOPHYS PROFIL W/O NST: CPT

## 2019-07-23 PROCEDURE — 76816 OB US FOLLOW-UP PER FETUS: CPT

## 2020-06-25 ENCOUNTER — ASOB RESULT (OUTPATIENT)
Age: 35
End: 2020-06-25

## 2020-06-25 ENCOUNTER — TRANSCRIPTION ENCOUNTER (OUTPATIENT)
Age: 35
End: 2020-06-25

## 2020-06-25 ENCOUNTER — APPOINTMENT (OUTPATIENT)
Dept: ANTEPARTUM | Facility: CLINIC | Age: 35
End: 2020-06-25
Payer: COMMERCIAL

## 2020-06-25 PROCEDURE — 99204 OFFICE O/P NEW MOD 45 MIN: CPT | Mod: 95

## 2020-07-10 ENCOUNTER — ASOB RESULT (OUTPATIENT)
Age: 35
End: 2020-07-10

## 2020-07-10 ENCOUNTER — APPOINTMENT (OUTPATIENT)
Dept: ANTEPARTUM | Facility: CLINIC | Age: 35
End: 2020-07-10
Payer: COMMERCIAL

## 2020-07-10 PROCEDURE — 36416 COLLJ CAPILLARY BLOOD SPEC: CPT

## 2020-07-10 PROCEDURE — 76813 OB US NUCHAL MEAS 1 GEST: CPT

## 2020-07-10 PROCEDURE — 76801 OB US < 14 WKS SINGLE FETUS: CPT

## 2020-09-09 ENCOUNTER — APPOINTMENT (OUTPATIENT)
Dept: ANTEPARTUM | Facility: CLINIC | Age: 35
End: 2020-09-09
Payer: COMMERCIAL

## 2020-09-09 ENCOUNTER — ASOB RESULT (OUTPATIENT)
Age: 35
End: 2020-09-09

## 2020-09-09 PROCEDURE — 76811 OB US DETAILED SNGL FETUS: CPT

## 2020-10-16 ENCOUNTER — EMERGENCY (EMERGENCY)
Facility: HOSPITAL | Age: 35
LOS: 1 days | Discharge: HOME | End: 2020-10-16
Attending: STUDENT IN AN ORGANIZED HEALTH CARE EDUCATION/TRAINING PROGRAM | Admitting: STUDENT IN AN ORGANIZED HEALTH CARE EDUCATION/TRAINING PROGRAM
Payer: COMMERCIAL

## 2020-10-16 VITALS
SYSTOLIC BLOOD PRESSURE: 103 MMHG | OXYGEN SATURATION: 100 % | RESPIRATION RATE: 17 BRPM | DIASTOLIC BLOOD PRESSURE: 62 MMHG | HEART RATE: 69 BPM

## 2020-10-16 VITALS
DIASTOLIC BLOOD PRESSURE: 63 MMHG | HEART RATE: 85 BPM | TEMPERATURE: 98 F | RESPIRATION RATE: 18 BRPM | SYSTOLIC BLOOD PRESSURE: 114 MMHG | OXYGEN SATURATION: 99 % | HEIGHT: 63 IN

## 2020-10-16 PROCEDURE — 73660 X-RAY EXAM OF TOE(S): CPT | Mod: 26,LT

## 2020-10-16 PROCEDURE — 99283 EMERGENCY DEPT VISIT LOW MDM: CPT

## 2020-10-16 RX ORDER — ACETAMINOPHEN 500 MG
650 TABLET ORAL ONCE
Refills: 0 | Status: COMPLETED | OUTPATIENT
Start: 2020-10-16 | End: 2020-10-16

## 2020-10-16 RX ADMIN — Medication 650 MILLIGRAM(S): at 09:23

## 2020-10-16 NOTE — ED PROVIDER NOTE - PROGRESS NOTE DETAILS
Quinn Hines MD: paged OB for nst Quinn Hines MD: ob didn't receive prior pages - called resident, she will set up NST Quinn Hines MD: d/w OB - NST wnl will dc w/ close outpt f/u

## 2020-10-16 NOTE — ED PROVIDER NOTE - NSFOLLOWUPINSTRUCTIONS_ED_ALL_ED_FT
Follow up with your OBGYN and a pcp within the next 3-5 days.     Please take ACETAMINOPHEN and IBUPROFEN as directed for your pain.

## 2020-10-16 NOTE — ED PROVIDER NOTE - CLINICAL SUMMARY MEDICAL DECISION MAKING FREE TEXT BOX
35F w/ L big toe pain, w/out constitutional symptoms - unlikely septic arthritis, possible gout vs other inflammatory condition, will rule out fracture w/ x-ray, check FS r/o hyperglycemia, give sx relief, patient not eager for joint aspiration at this point, made shared decision to follow up in a few days to see if pain improves - will d/w ob for NST given >24 wks pregnant

## 2020-10-16 NOTE — ED ADULT TRIAGE NOTE - CHIEF COMPLAINT QUOTE
Pt c/o swelling and pain to left foot. Pt states she had left calf pain last week and was ruled out for DVT. Pt is 6 months pregnant, denies any pregnancy-related complaints.

## 2020-10-16 NOTE — ED ADULT NURSE NOTE - OBJECTIVE STATEMENT
Pt 6 months pregnant c/o Lt leg/ankle swelling for the past 2 days, pt had ultrasound performed which ruled out a dvt in her Lt leg, denies sob, area is tender to touch and warm, medicated as ordered, will continue to monitor.

## 2020-10-16 NOTE — ED ADULT TRIAGE NOTE - SPO2 (%)
Becky MURRAY Just  [address]  [place] [address]      9/19/2017    Dear Becky     We have made several attempts to contact you by telephone, but have been unable to do so.       Please call the clinic at your earliest convenience to: Discuss scheduling colonoscopy       Sincerely,     MAYRA Silverio   Gastroenterology  [address] [place]  [address] [phone]   
99

## 2020-10-16 NOTE — CHART NOTE - NSCHARTNOTEFT_GEN_A_CORE
R4 OB Chart Note    Pt is 35y  at 27w who p/w L great toe pain and no OB complaints.    NST reviewed: cat 1, +accels, no decels, moderate variability.    d/w Dr. Andrew Smith MD PGY4

## 2020-10-16 NOTE — ED PROVIDER NOTE - PATIENT PORTAL LINK FT
You can access the FollowMyHealth Patient Portal offered by [facility] by registering at the following website: [email or web address]. By joining [employer]’s FollowMyHealth portal, you will also be able to view your health information using other applications (apps) compatible with our system.

## 2020-10-16 NOTE — ED PROVIDER NOTE - OBJECTIVE STATEMENT
35F  @27 wks pregnant - p/w L great toe pain and swelling since yesterday, worsened through last night but was worse this morning so decided to come to ED. Patient notes that the pain is worse when she stands or puts pressure on it, took tylenol 1:30am last night w/ mild improvement. Denies any other sx. No fever, cough, sob, n/v/d/c, etc.     OB: Jak Osman

## 2020-10-16 NOTE — ED PROVIDER NOTE - PHYSICAL EXAMINATION
*GEN:   comfortable, in no acute distress, AOx3  *EYES:   pupils equally round and reactive to light, extra-occular movements intact  *HEENT:   airway patent  *CV:   regular rate and rhythm  *RESP:   clear to auscultation bilaterally, non-labored  *ABD:   soft, non-tender  *:   no cva/flank tenderness  *EXTREM:   L big toe mild swelling distal-proximal, TTP intact ROM but limited 2/2 pain, no erythema, no calf pain / swelling, otherwise exam normal, good pulses  *SKIN:   dry, intact  *NEURO:   AOx3, no focal weakness or loss of sensation

## 2020-12-30 ENCOUNTER — APPOINTMENT (OUTPATIENT)
Dept: ANTEPARTUM | Facility: HOSPITAL | Age: 35
End: 2020-12-30

## 2020-12-30 ENCOUNTER — ASOB RESULT (OUTPATIENT)
Age: 35
End: 2020-12-30

## 2020-12-30 ENCOUNTER — APPOINTMENT (OUTPATIENT)
Dept: ANTEPARTUM | Facility: CLINIC | Age: 35
End: 2020-12-30
Payer: COMMERCIAL

## 2020-12-30 PROCEDURE — 76816 OB US FOLLOW-UP PER FETUS: CPT

## 2020-12-30 PROCEDURE — 99072 ADDL SUPL MATRL&STAF TM PHE: CPT

## 2020-12-30 PROCEDURE — 76819 FETAL BIOPHYS PROFIL W/O NST: CPT

## 2021-01-08 ENCOUNTER — LABORATORY RESULT (OUTPATIENT)
Age: 36
End: 2021-01-08

## 2021-01-08 ENCOUNTER — APPOINTMENT (OUTPATIENT)
Dept: DISASTER EMERGENCY | Facility: CLINIC | Age: 36
End: 2021-01-08

## 2021-01-11 ENCOUNTER — INPATIENT (INPATIENT)
Facility: HOSPITAL | Age: 36
LOS: 1 days | Discharge: HOME | End: 2021-01-13
Attending: OBSTETRICS & GYNECOLOGY | Admitting: OBSTETRICS & GYNECOLOGY

## 2021-01-11 ENCOUNTER — TRANSCRIPTION ENCOUNTER (OUTPATIENT)
Age: 36
End: 2021-01-11

## 2021-01-11 VITALS — HEART RATE: 76 BPM | SYSTOLIC BLOOD PRESSURE: 105 MMHG | DIASTOLIC BLOOD PRESSURE: 59 MMHG

## 2021-01-11 DIAGNOSIS — Z86.018 PERSONAL HISTORY OF OTHER BENIGN NEOPLASM: Chronic | ICD-10-CM

## 2021-01-11 DIAGNOSIS — Z86.73 PERSONAL HISTORY OF TRANSIENT ISCHEMIC ATTACK (TIA), AND CEREBRAL INFARCTION WITHOUT RESIDUAL DEFICITS: ICD-10-CM

## 2021-01-11 DIAGNOSIS — O40.3XX0 POLYHYDRAMNIOS, THIRD TRIMESTER, NOT APPLICABLE OR UNSPECIFIED: ICD-10-CM

## 2021-01-11 DIAGNOSIS — O40.9XX0 POLYHYDRAMNIOS, UNSPECIFIED TRIMESTER, NOT APPLICABLE OR UNSPECIFIED: ICD-10-CM

## 2021-01-11 LAB
ALBUMIN SERPL ELPH-MCNC: 3.7 G/DL — SIGNIFICANT CHANGE UP (ref 3.3–5)
ALP SERPL-CCNC: 256 U/L — HIGH (ref 40–120)
ALT FLD-CCNC: 14 U/L — SIGNIFICANT CHANGE UP (ref 4–33)
ANION GAP SERPL CALC-SCNC: 10 MMOL/L — SIGNIFICANT CHANGE UP (ref 7–14)
APTT BLD: 24.9 SEC — LOW (ref 27–36.3)
AST SERPL-CCNC: 15 U/L — SIGNIFICANT CHANGE UP (ref 4–32)
BASOPHILS # BLD AUTO: 0.02 K/UL — SIGNIFICANT CHANGE UP (ref 0–0.2)
BASOPHILS NFR BLD AUTO: 0.2 % — SIGNIFICANT CHANGE UP (ref 0–2)
BILIRUB SERPL-MCNC: <0.2 MG/DL — SIGNIFICANT CHANGE UP (ref 0.2–1.2)
BLD GP AB SCN SERPL QL: NEGATIVE — SIGNIFICANT CHANGE UP
BUN SERPL-MCNC: 7 MG/DL — SIGNIFICANT CHANGE UP (ref 7–23)
CALCIUM SERPL-MCNC: 9.5 MG/DL — SIGNIFICANT CHANGE UP (ref 8.4–10.5)
CHLORIDE SERPL-SCNC: 102 MMOL/L — SIGNIFICANT CHANGE UP (ref 98–107)
CO2 SERPL-SCNC: 24 MMOL/L — SIGNIFICANT CHANGE UP (ref 22–31)
CREAT SERPL-MCNC: 0.63 MG/DL — SIGNIFICANT CHANGE UP (ref 0.5–1.3)
EOSINOPHIL # BLD AUTO: 0.07 K/UL — SIGNIFICANT CHANGE UP (ref 0–0.5)
EOSINOPHIL NFR BLD AUTO: 0.7 % — SIGNIFICANT CHANGE UP (ref 0–6)
FIBRINOGEN PPP-MCNC: 725 MG/DL — HIGH (ref 290–520)
GLUCOSE SERPL-MCNC: 99 MG/DL — SIGNIFICANT CHANGE UP (ref 70–99)
HCT VFR BLD CALC: 37.2 % — SIGNIFICANT CHANGE UP (ref 34.5–45)
HGB BLD-MCNC: 12.4 G/DL — SIGNIFICANT CHANGE UP (ref 11.5–15.5)
IANC: 7.4 K/UL — SIGNIFICANT CHANGE UP (ref 1.5–8.5)
IMM GRANULOCYTES NFR BLD AUTO: 0.5 % — SIGNIFICANT CHANGE UP (ref 0–1.5)
INR BLD: 0.96 RATIO — SIGNIFICANT CHANGE UP (ref 0.88–1.16)
LYMPHOCYTES # BLD AUTO: 1.56 K/UL — SIGNIFICANT CHANGE UP (ref 1–3.3)
LYMPHOCYTES # BLD AUTO: 16.1 % — SIGNIFICANT CHANGE UP (ref 13–44)
MCHC RBC-ENTMCNC: 31.5 PG — SIGNIFICANT CHANGE UP (ref 27–34)
MCHC RBC-ENTMCNC: 33.3 GM/DL — SIGNIFICANT CHANGE UP (ref 32–36)
MCV RBC AUTO: 94.4 FL — SIGNIFICANT CHANGE UP (ref 80–100)
MONOCYTES # BLD AUTO: 0.56 K/UL — SIGNIFICANT CHANGE UP (ref 0–0.9)
MONOCYTES NFR BLD AUTO: 5.8 % — SIGNIFICANT CHANGE UP (ref 2–14)
NEUTROPHILS # BLD AUTO: 7.4 K/UL — SIGNIFICANT CHANGE UP (ref 1.8–7.4)
NEUTROPHILS NFR BLD AUTO: 76.7 % — SIGNIFICANT CHANGE UP (ref 43–77)
NRBC # BLD: 0 /100 WBCS — SIGNIFICANT CHANGE UP
NRBC # FLD: 0 K/UL — SIGNIFICANT CHANGE UP
PLATELET # BLD AUTO: 209 K/UL — SIGNIFICANT CHANGE UP (ref 150–400)
POTASSIUM SERPL-MCNC: 3.7 MMOL/L — SIGNIFICANT CHANGE UP (ref 3.5–5.3)
POTASSIUM SERPL-SCNC: 3.7 MMOL/L — SIGNIFICANT CHANGE UP (ref 3.5–5.3)
PROT SERPL-MCNC: 6.6 G/DL — SIGNIFICANT CHANGE UP (ref 6–8.3)
PROTHROM AB SERPL-ACNC: 11.1 SEC — SIGNIFICANT CHANGE UP (ref 10.6–13.6)
RBC # BLD: 3.94 M/UL — SIGNIFICANT CHANGE UP (ref 3.8–5.2)
RBC # FLD: 13 % — SIGNIFICANT CHANGE UP (ref 10.3–14.5)
RH IG SCN BLD-IMP: POSITIVE — SIGNIFICANT CHANGE UP
SODIUM SERPL-SCNC: 136 MMOL/L — SIGNIFICANT CHANGE UP (ref 135–145)
WBC # BLD: 9.66 K/UL — SIGNIFICANT CHANGE UP (ref 3.8–10.5)
WBC # FLD AUTO: 9.66 K/UL — SIGNIFICANT CHANGE UP (ref 3.8–10.5)

## 2021-01-11 RX ORDER — OXYTOCIN 10 UNIT/ML
2 VIAL (ML) INJECTION
Qty: 30 | Refills: 0 | Status: DISCONTINUED | OUTPATIENT
Start: 2021-01-11 | End: 2021-01-12

## 2021-01-11 RX ORDER — SODIUM CHLORIDE 9 MG/ML
1000 INJECTION, SOLUTION INTRAVENOUS
Refills: 0 | Status: DISCONTINUED | OUTPATIENT
Start: 2021-01-11 | End: 2021-01-12

## 2021-01-11 RX ORDER — OXYTOCIN 10 UNIT/ML
6 VIAL (ML) INJECTION
Qty: 30 | Refills: 0 | Status: DISCONTINUED | OUTPATIENT
Start: 2021-01-11 | End: 2021-01-11

## 2021-01-11 RX ORDER — OXYTOCIN 10 UNIT/ML
333.33 VIAL (ML) INJECTION
Qty: 20 | Refills: 0 | Status: DISCONTINUED | OUTPATIENT
Start: 2021-01-11 | End: 2021-01-12

## 2021-01-11 RX ADMIN — SODIUM CHLORIDE 125 MILLILITER(S): 9 INJECTION, SOLUTION INTRAVENOUS at 15:45

## 2021-01-11 NOTE — OB PROVIDER LABOR PROGRESS NOTE - NS_SUBJECTIVE/OBJECTIVE_OBGYN_ALL_OB_FT
R1 OB Labor Note    S: Patient seen and examined at bedside to place VC    T(C): 36.9 (01-11-21 @ 20:48), Max: 36.9 (01-11-21 @ 20:48)  HR: 68 (01-11-21 @ 22:05) (66 - 80)  BP: 105/56 (01-11-21 @ 20:43) (101/69 - 114/62)  BP(mean): --  ABP: --  ABP(mean): --  RR: 17 (01-11-21 @ 16:32) (17 - 18)  SpO2: 99% (01-11-21 @ 22:05) (97% - 100%)  Wt(kg): --  CVP(mm Hg): --  CI: --  CAPILLARY BLOOD GLUCOSE       N/A      01-11 @ 07:01  -  01-11 @ 22:10  --------------------------------------------------------  IN:    Lactated Ringers: 500 mL  Total IN: 500 mL    OUT:  Total OUT: 0 mL    Total NET: 500 mL

## 2021-01-11 NOTE — OB PROVIDER H&P - HISTORY OF PRESENT ILLNESS
34 y/o  EDC 21 @39+6w presents for scheduled IOL for hx of neurological event in previous pregnancy (suspected TIA vs Migraine) and polyhyradmnios. Denies abd pain, LOF, VB. GBS negative - pending results from 21. COVID positive  & 21.     AP course complicated by:   -COVID positive: experienced low grade fever, cough, fatigued x1 week from 20. Tested positive 20 as well as  and during presurgical testing on 21.    -Hx suspected TIA vs atypical migraine during last pregnancy. Pt experienced loss of peripheral vision, difficulty speaking, right sided numbness and facial droop @38w. MRI normal; pt was induced for labor. No residual s.s. Denies migraines since. Seen by neurologist @12w pregnancy, normal carotid dopplers; advised to be placed on 162mg Asa ppx (pt only took 81mg qd) and is for lovenox 40mg c3hecej postpartum.   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

## 2021-01-11 NOTE — CHART NOTE - NSCHARTNOTEFT_GEN_A_CORE
R1 Progress Note    Patient seen and examined at bedside for placement of 1st dose of vaginal cytotec.    NAD  Vital Signs Last 24 Hrs  T(C): 36.6 (2021 15:06), Max: 36.6 (2021 15:06)  T(F): 97.9 (2021 15:06), Max: 97.9 (2021 15:06)  HR: 73 (2021 16:13) (73 - 77)  BP: 105/59 (2021 15:06) (105/59 - 105/59)  BP(mean): --  RR: 18 (2021 15:06) (18 - 18)  SpO2: 98% (2021 16:13) (98% - 98%)    SVE 3/40/-3  EFM baseline 130s, moderate variability, +accels, -decels, Cat 1  TOCO irreg    A/P 35y  being induced for polyhydramnios (DWAINE 26 on ).      - s/p 1st dose of vag cyto  - fetus: cat 1 FHT  - pain: no complaints    Marty Dunn, PGY1

## 2021-01-11 NOTE — OB RN PATIENT PROFILE - PMH
Depression  Hx of xanax and lexapro - d/c meds 5 years ago  No pertinent past medical history     (normal spontaneous vaginal delivery)  2017 (FT F) & 2019 (FT F - hx of possible TIA)  SAB (spontaneous )  without D&C

## 2021-01-11 NOTE — OB PROVIDER H&P - ASSESSMENT
GEN NAD  A+Ox3  Lungs CTAB  Heart R/R/R  T(C): 36.6 (2021 15:06), Max: 36.6 (2021 15:06)  T(F): 97.9 (2021 15:06), Max: 97.9 (2021 15:06)  HR: 76 (2021 16:08) (73 - 77)  BP: 105/59 (2021 15:06) (105/59 - 105/59)  RR: 18 (2021 15:06) (18 - 18)  SpO2: 98% (2021 16:08) (98% - 98%)  /moderate variability/+ accels/no decels  [place] irregular  Sono vtx confirmed  SVE 3/40/-3    A/P: 36 y/o  EDC 21 @39+6w scheduled IOL for hx of neurological event in previous pregnancy (suspected TIA vs Migraine) and polyhyradmnios. COVID positive    -Admit to L&D  -Routine labs  -Covid labs  -IVF  -Anesthesia consult  -Vaginal cytotec  -socialwork postpartum  -lovenox 40mg x6 weeks postpartum  -D/W Dr. Mohinder elias, NP

## 2021-01-11 NOTE — OB PROVIDER LABOR PROGRESS NOTE - ASSESSMENT
A/P:   - Labor: IOL for poly and hx possible TIA. VC#2 placed. Will switch to Pitocin   - Fetus: Cat 1  - GBS: neg    Esthela Gay, PGY-1  d/w Dr. Kenyon

## 2021-01-12 LAB
SARS-COV-2 IGG SERPL QL IA: NEGATIVE — SIGNIFICANT CHANGE UP
SARS-COV-2 IGM SERPL IA-ACNC: 0.85 INDEX — SIGNIFICANT CHANGE UP
T PALLIDUM AB TITR SER: NEGATIVE — SIGNIFICANT CHANGE UP

## 2021-01-12 RX ORDER — KETOROLAC TROMETHAMINE 30 MG/ML
30 SYRINGE (ML) INJECTION ONCE
Refills: 0 | Status: DISCONTINUED | OUTPATIENT
Start: 2021-01-12 | End: 2021-01-12

## 2021-01-12 RX ORDER — IBUPROFEN 200 MG
600 TABLET ORAL EVERY 6 HOURS
Refills: 0 | Status: DISCONTINUED | OUTPATIENT
Start: 2021-01-12 | End: 2021-01-13

## 2021-01-12 RX ORDER — AER TRAVELER 0.5 G/1
1 SOLUTION RECTAL; TOPICAL EVERY 4 HOURS
Refills: 0 | Status: DISCONTINUED | OUTPATIENT
Start: 2021-01-12 | End: 2021-01-13

## 2021-01-12 RX ORDER — DIPHENHYDRAMINE HCL 50 MG
25 CAPSULE ORAL EVERY 6 HOURS
Refills: 0 | Status: DISCONTINUED | OUTPATIENT
Start: 2021-01-12 | End: 2021-01-13

## 2021-01-12 RX ORDER — OXYCODONE HYDROCHLORIDE 5 MG/1
5 TABLET ORAL ONCE
Refills: 0 | Status: DISCONTINUED | OUTPATIENT
Start: 2021-01-12 | End: 2021-01-13

## 2021-01-12 RX ORDER — IBUPROFEN 200 MG
2 TABLET ORAL
Qty: 0 | Refills: 0 | DISCHARGE

## 2021-01-12 RX ORDER — TETANUS TOXOID, REDUCED DIPHTHERIA TOXOID AND ACELLULAR PERTUSSIS VACCINE, ADSORBED 5; 2.5; 8; 8; 2.5 [IU]/.5ML; [IU]/.5ML; UG/.5ML; UG/.5ML; UG/.5ML
0.5 SUSPENSION INTRAMUSCULAR ONCE
Refills: 0 | Status: DISCONTINUED | OUTPATIENT
Start: 2021-01-12 | End: 2021-01-13

## 2021-01-12 RX ORDER — ACETAMINOPHEN 500 MG
975 TABLET ORAL
Refills: 0 | Status: DISCONTINUED | OUTPATIENT
Start: 2021-01-12 | End: 2021-01-13

## 2021-01-12 RX ORDER — LANOLIN
1 OINTMENT (GRAM) TOPICAL EVERY 6 HOURS
Refills: 0 | Status: DISCONTINUED | OUTPATIENT
Start: 2021-01-12 | End: 2021-01-13

## 2021-01-12 RX ORDER — DIBUCAINE 1 %
1 OINTMENT (GRAM) RECTAL EVERY 6 HOURS
Refills: 0 | Status: DISCONTINUED | OUTPATIENT
Start: 2021-01-12 | End: 2021-01-13

## 2021-01-12 RX ORDER — IBUPROFEN 200 MG
600 TABLET ORAL EVERY 6 HOURS
Refills: 0 | Status: COMPLETED | OUTPATIENT
Start: 2021-01-12 | End: 2021-12-11

## 2021-01-12 RX ORDER — HYDROCORTISONE 1 %
1 OINTMENT (GRAM) TOPICAL EVERY 6 HOURS
Refills: 0 | Status: DISCONTINUED | OUTPATIENT
Start: 2021-01-12 | End: 2021-01-13

## 2021-01-12 RX ORDER — SODIUM CHLORIDE 9 MG/ML
3 INJECTION INTRAMUSCULAR; INTRAVENOUS; SUBCUTANEOUS EVERY 8 HOURS
Refills: 0 | Status: DISCONTINUED | OUTPATIENT
Start: 2021-01-12 | End: 2021-01-13

## 2021-01-12 RX ORDER — MAGNESIUM HYDROXIDE 400 MG/1
30 TABLET, CHEWABLE ORAL
Refills: 0 | Status: DISCONTINUED | OUTPATIENT
Start: 2021-01-12 | End: 2021-01-13

## 2021-01-12 RX ORDER — OXYTOCIN 10 UNIT/ML
333.33 VIAL (ML) INJECTION
Qty: 20 | Refills: 0 | Status: DISCONTINUED | OUTPATIENT
Start: 2021-01-12 | End: 2021-01-12

## 2021-01-12 RX ORDER — PRAMOXINE HYDROCHLORIDE 150 MG/15G
1 AEROSOL, FOAM RECTAL EVERY 4 HOURS
Refills: 0 | Status: DISCONTINUED | OUTPATIENT
Start: 2021-01-12 | End: 2021-01-13

## 2021-01-12 RX ORDER — OXYCODONE HYDROCHLORIDE 5 MG/1
5 TABLET ORAL
Refills: 0 | Status: DISCONTINUED | OUTPATIENT
Start: 2021-01-12 | End: 2021-01-13

## 2021-01-12 RX ORDER — SIMETHICONE 80 MG/1
80 TABLET, CHEWABLE ORAL EVERY 4 HOURS
Refills: 0 | Status: DISCONTINUED | OUTPATIENT
Start: 2021-01-12 | End: 2021-01-13

## 2021-01-12 RX ORDER — BENZOCAINE 10 %
1 GEL (GRAM) MUCOUS MEMBRANE EVERY 6 HOURS
Refills: 0 | Status: DISCONTINUED | OUTPATIENT
Start: 2021-01-12 | End: 2021-01-13

## 2021-01-12 RX ORDER — ACETAMINOPHEN 500 MG
2 TABLET ORAL
Qty: 0 | Refills: 0 | DISCHARGE

## 2021-01-12 RX ORDER — ENOXAPARIN SODIUM 100 MG/ML
40 INJECTION SUBCUTANEOUS DAILY
Refills: 0 | Status: DISCONTINUED | OUTPATIENT
Start: 2021-01-12 | End: 2021-01-13

## 2021-01-12 RX ADMIN — Medication 1 APPLICATION(S): at 06:33

## 2021-01-12 RX ADMIN — SODIUM CHLORIDE 3 MILLILITER(S): 9 INJECTION INTRAMUSCULAR; INTRAVENOUS; SUBCUTANEOUS at 05:58

## 2021-01-12 RX ADMIN — Medication 1000 MILLIUNIT(S)/MIN: at 01:27

## 2021-01-12 RX ADMIN — Medication 975 MILLIGRAM(S): at 15:15

## 2021-01-12 RX ADMIN — Medication 600 MILLIGRAM(S): at 20:23

## 2021-01-12 RX ADMIN — ENOXAPARIN SODIUM 40 MILLIGRAM(S): 100 INJECTION SUBCUTANEOUS at 11:55

## 2021-01-12 RX ADMIN — SODIUM CHLORIDE 3 MILLILITER(S): 9 INJECTION INTRAMUSCULAR; INTRAVENOUS; SUBCUTANEOUS at 12:15

## 2021-01-12 RX ADMIN — Medication 1 TABLET(S): at 11:56

## 2021-01-12 RX ADMIN — Medication 975 MILLIGRAM(S): at 06:31

## 2021-01-12 NOTE — OB PROVIDER DELIVERY SUMMARY - NSPROVIDERDELIVERYNOTE_OBGYN_ALL_OB_FT
Delivery of liveborn female infant from DALTON position Nuchal x 2 . Head, shoulders, and body delivered without complication. Infant was suctioned. No mec. Delayed cord clamping and infant was passed to mother. Cord clamped and cut. Placenta delivered intact with a 3 vessel cord. Fundal massage was given and uterine fundus was found to be firm. Vaginal exam revealed an intact cervix, vaginal walls and sulci. Patient had a first degree laceration in the perineum that was repaired with 2.0 chromic suture. Excellent hemostasis was noted. Patient was stable and went to recovery. Count was correct x 2.

## 2021-01-12 NOTE — DISCHARGE NOTE OB - CARE PROVIDER_API CALL
Julieta Kenyon)  Obstetrics and Gynecology Obstetrics and Gynocology  [address] [place] [address]  Phone: [phone]  Fax: [phone]  Follow Up Time: 1 month

## 2021-01-12 NOTE — OB PROVIDER DELIVERY SUMMARY - NSVAGINALEXAMCERT_OBGYN_ALL_OB
The Delivery OB Provider certifies that vaginal examination and/or abdominal examination after the delivery was done and no foreign body was found.
No

## 2021-01-12 NOTE — DISCHARGE NOTE OB - CARE PLAN
Principal Discharge DX:	 (normal spontaneous vaginal delivery)  Goal:	post partum care  Assessment and plan of treatment:	recovery/ dvt prophlaxis

## 2021-01-12 NOTE — OB RN DELIVERY SUMMARY - NS_SEPSISRSKCALC_OBGYN_ALL_OB_FT
EOS calculated successfully. EOS Risk Factor: 0.5/1000 live births ([facility] national incidence); GA=40w;Temp=98.42; ROM=0.567; GBS='Unknown'; Antibiotics='No antibiotics or any antibiotics < 2 hrs prior to birth'

## 2021-01-12 NOTE — DISCHARGE NOTE OB - MEDICATION SUMMARY - MEDICATIONS TO STOP TAKING
I will STOP taking the medications listed below when I get home from the hospital:    Macrobid 100 mg oral capsule  -- 1 cap(s) by mouth 2 times a day   -- Finish all this medication unless otherwise directed by prescriber.  May discolor urine or feces.  Take with food or milk.

## 2021-01-12 NOTE — DISCHARGE NOTE OB - PATIENT PORTAL LINK FT
You can access the FollowMyHealth Patient Portal offered by [facility] by registering at the following website: [email or web address]. By joining [facility]’s FollowMyHealth portal, you will also be able to view your health information using other applications (apps) compatible with our system.

## 2021-01-13 VITALS
HEART RATE: 77 BPM | OXYGEN SATURATION: 98 % | TEMPERATURE: 98 F | SYSTOLIC BLOOD PRESSURE: 116 MMHG | DIASTOLIC BLOOD PRESSURE: 69 MMHG | RESPIRATION RATE: 16 BRPM

## 2021-01-13 LAB
HCT VFR BLD CALC: 35 % — SIGNIFICANT CHANGE UP (ref 34.5–45)
HGB BLD-MCNC: 11.6 G/DL — SIGNIFICANT CHANGE UP (ref 11.5–15.5)

## 2021-01-13 RX ORDER — DIBUCAINE 1 %
1 OINTMENT (GRAM) RECTAL
Qty: 28 | Refills: 0
Start: 2021-01-13 | End: 2021-01-19

## 2021-01-13 RX ORDER — ENOXAPARIN SODIUM 100 MG/ML
40 INJECTION SUBCUTANEOUS
Qty: 1640 | Refills: 0
Start: 2021-01-13 | End: 2021-02-22

## 2021-01-13 RX ORDER — BENZOCAINE 10 %
1 GEL (GRAM) MUCOUS MEMBRANE
Qty: 28 | Refills: 0
Start: 2021-01-13 | End: 2021-01-19

## 2021-01-13 RX ADMIN — SODIUM CHLORIDE 3 MILLILITER(S): 9 INJECTION INTRAMUSCULAR; INTRAVENOUS; SUBCUTANEOUS at 05:32

## 2021-01-13 RX ADMIN — ENOXAPARIN SODIUM 40 MILLIGRAM(S): 100 INJECTION SUBCUTANEOUS at 12:27

## 2021-01-13 RX ADMIN — Medication 600 MILLIGRAM(S): at 12:27

## 2021-01-13 RX ADMIN — SODIUM CHLORIDE 3 MILLILITER(S): 9 INJECTION INTRAMUSCULAR; INTRAVENOUS; SUBCUTANEOUS at 08:00

## 2021-01-13 RX ADMIN — Medication 975 MILLIGRAM(S): at 05:33

## 2021-01-13 RX ADMIN — Medication 1 TABLET(S): at 12:26

## 2021-01-13 NOTE — PROGRESS NOTE ADULT - PROBLEM SELECTOR PLAN 1
- Pain well controlled, continue current pain regimen  - Increase ambulation  - Continue regular diet  - Lovenox 40 for 6 weeks PP for history of TIA  - SW consult for history of depression    Shaista Steele, PGY1

## 2021-01-13 NOTE — PROGRESS NOTE ADULT - ATTENDING COMMENTS
Patient to be discharged home today after dose of Lovenox. Patient to follow up in the office in 4-6 weeks for postpartum care or PRN

## 2021-01-13 NOTE — PROGRESS NOTE ADULT - ASSESSMENT
A/P: 36yo PPD#1 s/p  with EBL of 200.  PMH significant for suspected TIA with last pregnancy. Also with history of depression. Patient is stable and doing well post-partum.

## 2021-01-14 ENCOUNTER — NON-APPOINTMENT (OUTPATIENT)
Age: 36
End: 2021-01-14

## 2021-01-14 PROBLEM — F32.9 MAJOR DEPRESSIVE DISORDER, SINGLE EPISODE, UNSPECIFIED: Chronic | Status: ACTIVE | Noted: 2021-01-11

## 2021-01-14 PROBLEM — O03.9 COMPLETE OR UNSPECIFIED SPONTANEOUS ABORTION WITHOUT COMPLICATION: Chronic | Status: ACTIVE | Noted: 2021-01-11

## 2021-01-14 RX ORDER — PNV NO.95/FERROUS FUM/FOLIC AC 28MG-0.8MG
TABLET ORAL DAILY
Refills: 0 | Status: ACTIVE | COMMUNITY
Start: 2021-01-14

## 2021-01-16 ENCOUNTER — NON-APPOINTMENT (OUTPATIENT)
Age: 36
End: 2021-01-16

## 2021-01-20 ENCOUNTER — NON-APPOINTMENT (OUTPATIENT)
Age: 36
End: 2021-01-20

## 2021-02-03 ENCOUNTER — NON-APPOINTMENT (OUTPATIENT)
Age: 36
End: 2021-02-03

## 2021-02-12 ENCOUNTER — NON-APPOINTMENT (OUTPATIENT)
Age: 36
End: 2021-02-12

## 2021-03-04 ENCOUNTER — APPOINTMENT (OUTPATIENT)
Dept: INTERNAL MEDICINE | Facility: CLINIC | Age: 36
End: 2021-03-04
Payer: COMMERCIAL

## 2021-03-04 VITALS
HEIGHT: 63 IN | SYSTOLIC BLOOD PRESSURE: 128 MMHG | OXYGEN SATURATION: 98 % | HEART RATE: 78 BPM | DIASTOLIC BLOOD PRESSURE: 78 MMHG | WEIGHT: 168 LBS | RESPIRATION RATE: 15 BRPM | BODY MASS INDEX: 29.77 KG/M2

## 2021-03-04 DIAGNOSIS — Z20.828 CONTACT WITH AND (SUSPECTED) EXPOSURE TO OTHER VIRAL COMMUNICABLE DISEASES: ICD-10-CM

## 2021-03-04 DIAGNOSIS — Z78.9 OTHER SPECIFIED HEALTH STATUS: ICD-10-CM

## 2021-03-04 DIAGNOSIS — Z80.0 FAMILY HISTORY OF MALIGNANT NEOPLASM OF DIGESTIVE ORGANS: ICD-10-CM

## 2021-03-04 DIAGNOSIS — Z82.61 FAMILY HISTORY OF ARTHRITIS: ICD-10-CM

## 2021-03-04 DIAGNOSIS — Z01.818 ENCOUNTER FOR OTHER PREPROCEDURAL EXAMINATION: ICD-10-CM

## 2021-03-04 PROCEDURE — 99072 ADDL SUPL MATRL&STAF TM PHE: CPT

## 2021-03-04 PROCEDURE — 99385 PREV VISIT NEW AGE 18-39: CPT | Mod: 25

## 2021-03-04 PROCEDURE — 36415 COLL VENOUS BLD VENIPUNCTURE: CPT

## 2021-03-04 RX ORDER — ACETAMINOPHEN 500 MG/1
500 TABLET ORAL
Refills: 0 | Status: DISCONTINUED | COMMUNITY
Start: 2021-01-14 | End: 2021-03-04

## 2021-03-04 RX ORDER — ENOXAPARIN SODIUM 40 MG/.4ML
40 INJECTION SUBCUTANEOUS
Refills: 0 | Status: DISCONTINUED | COMMUNITY
Start: 2021-01-14 | End: 2021-03-04

## 2021-03-04 NOTE — PHYSICAL EXAM
[No Acute Distress] : no acute distress [Well Nourished] : well nourished [Well Developed] : well developed [Well-Appearing] : well-appearing [Normal Voice/Communication] : normal voice/communication [Normal Sclera/Conjunctiva] : normal sclera/conjunctiva [PERRL] : pupils equal round and reactive to light [EOMI] : extraocular movements intact [Normal Outer Ear/Nose] : the outer ears and nose were normal in appearance [Normal Oropharynx] : the oropharynx was normal [No JVD] : no jugular venous distention [No Lymphadenopathy] : no lymphadenopathy [Supple] : supple [Thyroid Normal, No Nodules] : the thyroid was normal and there were no nodules present [No Respiratory Distress] : no respiratory distress  [No Accessory Muscle Use] : no accessory muscle use [Clear to Auscultation] : lungs were clear to auscultation bilaterally [Normal Rate] : normal rate  [Regular Rhythm] : with a regular rhythm [Normal S1, S2] : normal S1 and S2 [No Murmur] : no murmur heard [No Carotid Bruits] : no carotid bruits [No Abdominal Bruit] : a ~M bruit was not heard ~T in the abdomen [Pedal Pulses Present] : the pedal pulses are present [No Edema] : there was no peripheral edema [No Palpable Aorta] : no palpable aorta [Soft] : abdomen soft [Non Tender] : non-tender [Non-distended] : non-distended [No Masses] : no abdominal mass palpated [No HSM] : no HSM [Normal Bowel Sounds] : normal bowel sounds [Normal Posterior Cervical Nodes] : no posterior cervical lymphadenopathy [Normal Anterior Cervical Nodes] : no anterior cervical lymphadenopathy [No CVA Tenderness] : no CVA  tenderness [No Spinal Tenderness] : no spinal tenderness [No Joint Swelling] : no joint swelling [Grossly Normal Strength/Tone] : grossly normal strength/tone [No Rash] : no rash [Coordination Grossly Intact] : coordination grossly intact [No Focal Deficits] : no focal deficits [Normal Gait] : normal gait [Speech Grossly Normal] : speech grossly normal [Normal Affect] : the affect was normal [Alert and Oriented x3] : oriented to person, place, and time [Normal Mood] : the mood was normal [Normal Insight/Judgement] : insight and judgment were intact

## 2021-03-04 NOTE — HEALTH RISK ASSESSMENT
[No] : In the past 12 months have you used drugs other than those required for medical reasons? No [No falls in past year] : Patient reported no falls in the past year [0] : 2) Feeling down, depressed, or hopeless: Not at all (0) [None] : None [With Family] : lives with family [Employed] : employed [] :  [Sexually Active] : sexually active [Feels Safe at Home] : Feels safe at home [Fully functional (bathing, dressing, toileting, transferring, walking, feeding)] : Fully functional (bathing, dressing, toileting, transferring, walking, feeding) [Fully functional (using the telephone, shopping, preparing meals, housekeeping, doing laundry, using] : Fully functional and needs no help or supervision to perform IADLs (using the telephone, shopping, preparing meals, housekeeping, doing laundry, using transportation, managing medications and managing finances) [Smoke Detector] : smoke detector [Carbon Monoxide Detector] : carbon monoxide detector [Seat Belt] :  uses seat belt [Sunscreen] : uses sunscreen [] : No [Audit-CScore] : 0 [[id number]] : 0 [EyeExamDate] : 2020 [Change in mental status noted] : No change in mental status noted [High Risk Behavior] : no high risk behavior [Reports changes in hearing] : Reports no changes in hearing [Reports changes in vision] : Reports no changes in vision [Reports changes in dental health] : Reports no changes in dental health [PapSmearDate] : 2021

## 2021-03-04 NOTE — HISTORY OF PRESENT ILLNESS
[FreeTextEntry1] : annual PE [de-identified] : 36 y/o female here for annual PE\par Reports covid infection a few mo ago.\par no c/o\par \par gyn - regular\par \par vaccinations - tet '19\par gets flu vaccine

## 2021-03-07 LAB
25(OH)D3 SERPL-MCNC: 35.1 NG/ML
ALBUMIN SERPL ELPH-MCNC: 4.5 G/DL
ALP BLD-CCNC: 65 U/L
ALT SERPL-CCNC: 23 U/L
ANION GAP SERPL CALC-SCNC: 12 MMOL/L
AST SERPL-CCNC: 23 U/L
BASOPHILS # BLD AUTO: 0.04 K/UL
BASOPHILS NFR BLD AUTO: 0.5 %
BILIRUB SERPL-MCNC: <0.2 MG/DL
BUN SERPL-MCNC: 25 MG/DL
CALCIUM SERPL-MCNC: 9.5 MG/DL
CHLORIDE SERPL-SCNC: 106 MMOL/L
CHOLEST SERPL-MCNC: 154 MG/DL
CO2 SERPL-SCNC: 23 MMOL/L
CREAT SERPL-MCNC: 0.84 MG/DL
EOSINOPHIL # BLD AUTO: 0.28 K/UL
EOSINOPHIL NFR BLD AUTO: 3.7 %
ESTIMATED AVERAGE GLUCOSE: 97 MG/DL
FERRITIN SERPL-MCNC: 35 NG/ML
GLUCOSE SERPL-MCNC: 92 MG/DL
HBA1C MFR BLD HPLC: 5 %
HCT VFR BLD CALC: 40.8 %
HDLC SERPL-MCNC: 73 MG/DL
HGB BLD-MCNC: 13.9 G/DL
IMM GRANULOCYTES NFR BLD AUTO: 0.1 %
IRON SATN MFR SERPL: 26 %
IRON SERPL-MCNC: 88 UG/DL
LDLC SERPL CALC-MCNC: 62 MG/DL
LYMPHOCYTES # BLD AUTO: 2.61 K/UL
LYMPHOCYTES NFR BLD AUTO: 34.3 %
MAN DIFF?: NORMAL
MCHC RBC-ENTMCNC: 32.7 PG
MCHC RBC-ENTMCNC: 34.1 GM/DL
MCV RBC AUTO: 96 FL
MONOCYTES # BLD AUTO: 0.48 K/UL
MONOCYTES NFR BLD AUTO: 6.3 %
NEUTROPHILS # BLD AUTO: 4.18 K/UL
NEUTROPHILS NFR BLD AUTO: 55.1 %
NONHDLC SERPL-MCNC: 82 MG/DL
PLATELET # BLD AUTO: 206 K/UL
POTASSIUM SERPL-SCNC: 4.3 MMOL/L
PROT SERPL-MCNC: 7.1 G/DL
RBC # BLD: 4.25 M/UL
RBC # FLD: 12 %
SARS-COV-2 IGG SERPL IA-ACNC: 4.93 RATIO
SARS-COV-2 IGG SERPL QL IA: POSITIVE
SODIUM SERPL-SCNC: 142 MMOL/L
TIBC SERPL-MCNC: 336 UG/DL
TRIGL SERPL-MCNC: 97 MG/DL
TSH SERPL-ACNC: 2.17 UIU/ML
UIBC SERPL-MCNC: 248 UG/DL
WBC # FLD AUTO: 7.6 K/UL

## 2021-05-06 ENCOUNTER — APPOINTMENT (OUTPATIENT)
Dept: INTERNAL MEDICINE | Facility: CLINIC | Age: 36
End: 2021-05-06
Payer: COMMERCIAL

## 2021-05-06 VITALS
WEIGHT: 166 LBS | HEIGHT: 63 IN | SYSTOLIC BLOOD PRESSURE: 118 MMHG | RESPIRATION RATE: 16 BRPM | TEMPERATURE: 98 F | OXYGEN SATURATION: 98 % | BODY MASS INDEX: 29.41 KG/M2 | DIASTOLIC BLOOD PRESSURE: 70 MMHG | HEART RATE: 68 BPM

## 2021-05-06 DIAGNOSIS — L03.011 CELLULITIS OF RIGHT FINGER: ICD-10-CM

## 2021-05-06 PROCEDURE — 99214 OFFICE O/P EST MOD 30 MIN: CPT | Mod: 25

## 2021-05-06 PROCEDURE — 36415 COLL VENOUS BLD VENIPUNCTURE: CPT

## 2021-05-06 PROCEDURE — 99072 ADDL SUPL MATRL&STAF TM PHE: CPT

## 2021-05-06 RX ORDER — MUPIROCIN 20 MG/G
2 OINTMENT TOPICAL 3 TIMES DAILY
Qty: 1 | Refills: 1 | Status: ACTIVE | COMMUNITY
Start: 2021-05-06 | End: 1900-01-01

## 2021-05-06 NOTE — REVIEW OF SYSTEMS
[Fatigue] : fatigue [Negative] : Heme/Lymph [FreeTextEntry4] : as per hpi [de-identified] : as per HPI

## 2021-05-06 NOTE — PHYSICAL EXAM
[No Acute Distress] : no acute distress [Well Nourished] : well nourished [Well Developed] : well developed [Well-Appearing] : well-appearing [Normal Voice/Communication] : normal voice/communication [Normal Sclera/Conjunctiva] : normal sclera/conjunctiva [PERRL] : pupils equal round and reactive to light [EOMI] : extraocular movements intact [Normal Outer Ear/Nose] : the outer ears and nose were normal in appearance [Normal Oropharynx] : the oropharynx was normal [No JVD] : no jugular venous distention [No Lymphadenopathy] : no lymphadenopathy [Supple] : supple [Thyroid Normal, No Nodules] : the thyroid was normal and there were no nodules present [No Respiratory Distress] : no respiratory distress  [No Accessory Muscle Use] : no accessory muscle use [Clear to Auscultation] : lungs were clear to auscultation bilaterally [Normal Rate] : normal rate  [Regular Rhythm] : with a regular rhythm [Normal S1, S2] : normal S1 and S2 [No Murmur] : no murmur heard [No Carotid Bruits] : no carotid bruits [No Abdominal Bruit] : a ~M bruit was not heard ~T in the abdomen [Pedal Pulses Present] : the pedal pulses are present [No Edema] : there was no peripheral edema [No Palpable Aorta] : no palpable aorta [Soft] : abdomen soft [Non Tender] : non-tender [Non-distended] : non-distended [No Masses] : no abdominal mass palpated [No HSM] : no HSM [Normal Bowel Sounds] : normal bowel sounds [Normal Posterior Cervical Nodes] : no posterior cervical lymphadenopathy [Normal Anterior Cervical Nodes] : no anterior cervical lymphadenopathy [No CVA Tenderness] : no CVA  tenderness [No Spinal Tenderness] : no spinal tenderness [No Joint Swelling] : no joint swelling [Grossly Normal Strength/Tone] : grossly normal strength/tone [Coordination Grossly Intact] : coordination grossly intact [No Focal Deficits] : no focal deficits [Normal Gait] : normal gait [Speech Grossly Normal] : speech grossly normal [Normal Affect] : the affect was normal [Alert and Oriented x3] : oriented to person, place, and time [Normal Mood] : the mood was normal [Normal Insight/Judgement] : insight and judgment were intact [de-identified] : no rt brachial nodes [de-identified] : paronychia at the medial rt ring finger.  no drainage.

## 2021-05-06 NOTE — HEALTH RISK ASSESSMENT
[No] : In the past 12 months have you used drugs other than those required for medical reasons? No [0] : 2) Feeling down, depressed, or hopeless: Not at all (0) [] : No [Audit-CScore] : 0 [[id number]] : 0

## 2021-05-06 NOTE — HISTORY OF PRESENT ILLNESS
[FreeTextEntry1] : dysomnia, dysgusia, hair loss, fatigue, paronychia [de-identified] : 36 y/o female here for acute sx.\par s/p covid infection a few mo ago.\par with altered sense of smell and taste for ` 3 weeks.  Also with some hair loss and fatigue.  Mainly with meats and coffee.  Had recovered from the covid sx after she had the infection.  no congestion, fevers, sinus sx.  \par also with painful swelling at the rt ring finger medial nail fold.  no drainage.  + erythema.  no wound or ingrown nail/.  No fevers.  Reports that it appears slightly better.  \par \par gyn - regular\par \par vaccinations - tet '19\par gets flu vaccine

## 2021-05-12 LAB
ANA PAT FLD IF-IMP: ABNORMAL
ANA PATTERN: ABNORMAL
ANA SER IF-ACNC: ABNORMAL
ANA TITER: ABNORMAL
COVID-19 SPIKE DOMAIN ANTIBODY INTERPRETATION: POSITIVE
CRP SERPL-MCNC: <3 MG/L
ERYTHROCYTE [SEDIMENTATION RATE] IN BLOOD BY WESTERGREN METHOD: 14 MM/HR
SARS-COV-2 AB SERPL IA-ACNC: >250 U/ML

## 2021-05-25 ENCOUNTER — APPOINTMENT (OUTPATIENT)
Dept: OTOLARYNGOLOGY | Facility: CLINIC | Age: 36
End: 2021-05-25
Payer: COMMERCIAL

## 2021-05-25 VITALS
DIASTOLIC BLOOD PRESSURE: 60 MMHG | TEMPERATURE: 98 F | SYSTOLIC BLOOD PRESSURE: 100 MMHG | WEIGHT: 164 LBS | BODY MASS INDEX: 29.06 KG/M2 | HEIGHT: 63 IN

## 2021-05-25 PROCEDURE — 99244 OFF/OP CNSLTJ NEW/EST MOD 40: CPT | Mod: 25

## 2021-05-25 PROCEDURE — 99072 ADDL SUPL MATRL&STAF TM PHE: CPT

## 2021-05-25 PROCEDURE — 31231 NASAL ENDOSCOPY DX: CPT

## 2021-05-25 NOTE — CONSULT LETTER

## 2021-05-25 NOTE — HISTORY OF PRESENT ILLNESS
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 Stephane Enamorado (PCP)\par Had COVID in December of 2020 at which time had complete loss of smell and taste for 1mo\par Then had some progressive return over following next few months to the point that things were totally back\par Now for 1.5-2mo of severe chemical and burning-like parosmia-- notices w certain odorants such as chicken\par Has also had similar situation w foods-- has changed eating habits such as less fish\par Many things smell and taste completely normal as compared to prior\par Denies all other sinonasal symptoms\par \par PMH: denies\par PSH: lipoma removal\par Meds: prenatal vitamins\par

## 2021-07-26 ENCOUNTER — NON-APPOINTMENT (OUTPATIENT)
Age: 36
End: 2021-07-26

## 2021-08-12 ENCOUNTER — APPOINTMENT (OUTPATIENT)
Dept: INTERNAL MEDICINE | Facility: CLINIC | Age: 36
End: 2021-08-12
Payer: COMMERCIAL

## 2021-08-12 VITALS
WEIGHT: 160 LBS | BODY MASS INDEX: 28.35 KG/M2 | HEART RATE: 77 BPM | OXYGEN SATURATION: 99 % | SYSTOLIC BLOOD PRESSURE: 120 MMHG | HEIGHT: 63 IN | DIASTOLIC BLOOD PRESSURE: 80 MMHG | RESPIRATION RATE: 16 BRPM

## 2021-08-12 PROCEDURE — 99213 OFFICE O/P EST LOW 20 MIN: CPT

## 2021-08-12 NOTE — HEALTH RISK ASSESSMENT
[No] : In the past 12 months have you used drugs other than those required for medical reasons? No [0] : 2) Feeling down, depressed, or hopeless: Not at all (0) [PHQ-2 Negative - No further assessment needed] : PHQ-2 Negative - No further assessment needed [] : No [Audit-CScore] : 0 [[id number]] : 0

## 2021-08-12 NOTE — PHYSICAL EXAM
[No Acute Distress] : no acute distress [Well Nourished] : well nourished [Well Developed] : well developed [Well-Appearing] : well-appearing [Normal Voice/Communication] : normal voice/communication [Normal Sclera/Conjunctiva] : normal sclera/conjunctiva [PERRL] : pupils equal round and reactive to light [EOMI] : extraocular movements intact [Normal Outer Ear/Nose] : the outer ears and nose were normal in appearance [Normal Oropharynx] : the oropharynx was normal [No JVD] : no jugular venous distention [No Lymphadenopathy] : no lymphadenopathy [Supple] : supple [Thyroid Normal, No Nodules] : the thyroid was normal and there were no nodules present [No Respiratory Distress] : no respiratory distress  [No Accessory Muscle Use] : no accessory muscle use [Clear to Auscultation] : lungs were clear to auscultation bilaterally [Normal Rate] : normal rate  [Regular Rhythm] : with a regular rhythm [Normal S1, S2] : normal S1 and S2 [No Murmur] : no murmur heard [No Carotid Bruits] : no carotid bruits [No Abdominal Bruit] : a ~M bruit was not heard ~T in the abdomen [Pedal Pulses Present] : the pedal pulses are present [No Edema] : there was no peripheral edema [No Palpable Aorta] : no palpable aorta [Soft] : abdomen soft [Non Tender] : non-tender [Non-distended] : non-distended [No Masses] : no abdominal mass palpated [No HSM] : no HSM [Normal Bowel Sounds] : normal bowel sounds [Normal Posterior Cervical Nodes] : no posterior cervical lymphadenopathy [Normal Anterior Cervical Nodes] : no anterior cervical lymphadenopathy [No CVA Tenderness] : no CVA  tenderness [No Spinal Tenderness] : no spinal tenderness [No Joint Swelling] : no joint swelling [Grossly Normal Strength/Tone] : grossly normal strength/tone [Coordination Grossly Intact] : coordination grossly intact [No Focal Deficits] : no focal deficits [Normal Gait] : normal gait [Speech Grossly Normal] : speech grossly normal [Normal Affect] : the affect was normal [Alert and Oriented x3] : oriented to person, place, and time [Normal Mood] : the mood was normal [Normal Insight/Judgement] : insight and judgment were intact [de-identified] : no rt brachial nodes [de-identified] : forward posture at the neck and sl rounded shoulders.

## 2021-08-12 NOTE — HISTORY OF PRESENT ILLNESS
[FreeTextEntry1] : dysomnia, dysgusia, hair loss, fatigue, paronychia [de-identified] : 35 y/o female here for f/u and acute\par s/p covid infection.\par Residual sx have been continuing to improve.  Saw ENt for the smell and taste sx.  Have been improving.  Doing some smell training. \par Saw rheumatology for the positive ALLISON - reports that the w/u was negative.  \par Paronychia has been healed.  \par Has been having upper back pain for a long time.  bothers her more at night, better with getting up and moving around.  Occ gets after a long day at work.  no radiations to the extremities.  no numbness or weakness.  no urinary sx.  No fecal incont.  no saddle anesthesia.  no recalled injury to the area.  no noted swelling, erythema or bruising.  some relief with stretching and tylenol.  Reports ? poor posture.  \par \par gyn - regular\par \par vaccinations - tet '19\par gets flu vaccine

## 2021-08-12 NOTE — ASSESSMENT
[FreeTextEntry1] : reviewed with the pt the ENT note and plan and reviewed with her the labs from the last visit.

## 2021-08-12 NOTE — REVIEW OF SYSTEMS
[Negative] : Heme/Lymph [Back Pain] : back pain [Fatigue] : no fatigue [FreeTextEntry4] : as per hpi

## 2021-09-14 ENCOUNTER — APPOINTMENT (OUTPATIENT)
Dept: OTOLARYNGOLOGY | Facility: CLINIC | Age: 36
End: 2021-09-14

## 2021-11-02 ENCOUNTER — APPOINTMENT (OUTPATIENT)
Dept: OTOLARYNGOLOGY | Facility: CLINIC | Age: 36
End: 2021-11-02
Payer: COMMERCIAL

## 2021-11-02 VITALS
WEIGHT: 155 LBS | HEIGHT: 63 IN | OXYGEN SATURATION: 98 % | HEART RATE: 76 BPM | DIASTOLIC BLOOD PRESSURE: 80 MMHG | TEMPERATURE: 97.7 F | SYSTOLIC BLOOD PRESSURE: 120 MMHG | BODY MASS INDEX: 27.46 KG/M2

## 2021-11-02 DIAGNOSIS — U07.1 COVID-19: ICD-10-CM

## 2021-11-02 DIAGNOSIS — R43.2 PARAGEUSIA: ICD-10-CM

## 2021-11-02 PROCEDURE — 99213 OFFICE O/P EST LOW 20 MIN: CPT | Mod: 25

## 2021-11-02 PROCEDURE — 31231 NASAL ENDOSCOPY DX: CPT

## 2021-11-02 NOTE — HISTORY OF PRESENT ILLNESS
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

## 2021-11-02 NOTE — REASON FOR VISIT
[Subsequent Evaluation] : a subsequent evaluation for [FreeTextEntry2] : [facility]

## 2022-03-08 ENCOUNTER — NON-APPOINTMENT (OUTPATIENT)
Age: 37
End: 2022-03-08

## 2022-03-08 ENCOUNTER — APPOINTMENT (OUTPATIENT)
Dept: INTERNAL MEDICINE | Facility: CLINIC | Age: 37
End: 2022-03-08
Payer: COMMERCIAL

## 2022-03-08 VITALS
HEIGHT: 63 IN | RESPIRATION RATE: 16 BRPM | BODY MASS INDEX: 28 KG/M2 | WEIGHT: 158 LBS | HEART RATE: 78 BPM | OXYGEN SATURATION: 98 % | DIASTOLIC BLOOD PRESSURE: 80 MMHG | SYSTOLIC BLOOD PRESSURE: 120 MMHG

## 2022-03-08 DIAGNOSIS — Z00.00 ENCOUNTER FOR GENERAL ADULT MEDICAL EXAMINATION W/OUT ABNORMAL FINDINGS: ICD-10-CM

## 2022-03-08 DIAGNOSIS — Z78.9 OTHER SPECIFIED HEALTH STATUS: ICD-10-CM

## 2022-03-08 PROCEDURE — 36415 COLL VENOUS BLD VENIPUNCTURE: CPT

## 2022-03-08 PROCEDURE — 99395 PREV VISIT EST AGE 18-39: CPT | Mod: 25

## 2022-03-08 PROCEDURE — 93000 ELECTROCARDIOGRAM COMPLETE: CPT

## 2022-03-08 NOTE — HEALTH RISK ASSESSMENT
[Never] : Never [No] : In the past 12 months have you used drugs other than those required for medical reasons? No [No falls in past year] : Patient reported no falls in the past year [0] : 2) Feeling down, depressed, or hopeless: Not at all (0) [PHQ-2 Negative - No further assessment needed] : PHQ-2 Negative - No further assessment needed [None] : None [With Family] : lives with family [Employed] : employed [] :  [Feels Safe at Home] : Feels safe at home [Fully functional (bathing, dressing, toileting, transferring, walking, feeding)] : Fully functional (bathing, dressing, toileting, transferring, walking, feeding) [Fully functional (using the telephone, shopping, preparing meals, housekeeping, doing laundry, using] : Fully functional and needs no help or supervision to perform IADLs (using the telephone, shopping, preparing meals, housekeeping, doing laundry, using transportation, managing medications and managing finances) [Smoke Detector] : smoke detector [Carbon Monoxide Detector] : carbon monoxide detector [Seat Belt] :  uses seat belt [Sunscreen] : uses sunscreen [Patient reported PAP Smear was normal] : Patient reported PAP Smear was normal [Audit-CScore] : 0 [[id number]] : 0 [Change in mental status noted] : No change in mental status noted [High Risk Behavior] : no high risk behavior [Reports changes in hearing] : Reports no changes in hearing [Reports changes in vision] : Reports no changes in vision [Reports changes in dental health] : Reports no changes in dental health [PapSmearDate] : 5/21

## 2022-03-08 NOTE — REVIEW OF SYSTEMS
[Back Pain] : back pain [Negative] : Heme/Lymph [Joint Pain] : joint pain [Fatigue] : no fatigue [FreeTextEntry4] : as per hpi [de-identified] : as per HPI

## 2022-03-08 NOTE — PHYSICAL EXAM
[No Acute Distress] : no acute distress [Well Nourished] : well nourished [Well Developed] : well developed [Well-Appearing] : well-appearing [Normal Voice/Communication] : normal voice/communication [Normal Sclera/Conjunctiva] : normal sclera/conjunctiva [PERRL] : pupils equal round and reactive to light [EOMI] : extraocular movements intact [Normal Outer Ear/Nose] : the outer ears and nose were normal in appearance [Normal Oropharynx] : the oropharynx was normal [No JVD] : no jugular venous distention [No Lymphadenopathy] : no lymphadenopathy [Supple] : supple [Thyroid Normal, No Nodules] : the thyroid was normal and there were no nodules present [No Respiratory Distress] : no respiratory distress  [No Accessory Muscle Use] : no accessory muscle use [Clear to Auscultation] : lungs were clear to auscultation bilaterally [Normal Rate] : normal rate  [Regular Rhythm] : with a regular rhythm [Normal S1, S2] : normal S1 and S2 [No Murmur] : no murmur heard [No Carotid Bruits] : no carotid bruits [No Abdominal Bruit] : a ~M bruit was not heard ~T in the abdomen [Pedal Pulses Present] : the pedal pulses are present [No Edema] : there was no peripheral edema [No Palpable Aorta] : no palpable aorta [Soft] : abdomen soft [Non Tender] : non-tender [Non-distended] : non-distended [No Masses] : no abdominal mass palpated [No HSM] : no HSM [Normal Bowel Sounds] : normal bowel sounds [Normal Posterior Cervical Nodes] : no posterior cervical lymphadenopathy [Normal Anterior Cervical Nodes] : no anterior cervical lymphadenopathy [No CVA Tenderness] : no CVA  tenderness [No Spinal Tenderness] : no spinal tenderness [No Joint Swelling] : no joint swelling [Grossly Normal Strength/Tone] : grossly normal strength/tone [Coordination Grossly Intact] : coordination grossly intact [No Focal Deficits] : no focal deficits [Normal Gait] : normal gait [Speech Grossly Normal] : speech grossly normal [Normal Affect] : the affect was normal [Alert and Oriented x3] : oriented to person, place, and time [Normal Mood] : the mood was normal [Normal Insight/Judgement] : insight and judgment were intact [Normal TMs] : both tympanic membranes were normal [de-identified] : no tenderness [de-identified] : neg ovidio and phalen

## 2022-05-26 LAB
25(OH)D3 SERPL-MCNC: 26.4 NG/ML
ALBUMIN SERPL ELPH-MCNC: 4.5 G/DL
ALP BLD-CCNC: 46 U/L
ALT SERPL-CCNC: 16 U/L
ANA PAT FLD IF-IMP: ABNORMAL
ANA SER IF-ACNC: ABNORMAL
ANION GAP SERPL CALC-SCNC: 11 MMOL/L
AST SERPL-CCNC: 16 U/L
BASOPHILS # BLD AUTO: 0.05 K/UL
BASOPHILS NFR BLD AUTO: 0.7 %
BILIRUB SERPL-MCNC: 0.2 MG/DL
BUN SERPL-MCNC: 15 MG/DL
CALCIUM SERPL-MCNC: 9.6 MG/DL
CHLORIDE SERPL-SCNC: 105 MMOL/L
CHOLEST SERPL-MCNC: 150 MG/DL
CO2 SERPL-SCNC: 25 MMOL/L
CREAT SERPL-MCNC: 0.76 MG/DL
CRP SERPL-MCNC: <3 MG/L
EGFR: 105 ML/MIN/1.73M2
EOSINOPHIL # BLD AUTO: 0.22 K/UL
EOSINOPHIL NFR BLD AUTO: 3.1 %
ERYTHROCYTE [SEDIMENTATION RATE] IN BLOOD BY WESTERGREN METHOD: 4 MM/HR
ESTIMATED AVERAGE GLUCOSE: 94 MG/DL
GLUCOSE SERPL-MCNC: 85 MG/DL
HBA1C MFR BLD HPLC: 4.9 %
HCT VFR BLD CALC: 42.3 %
HDLC SERPL-MCNC: 71 MG/DL
HGB BLD-MCNC: 13.9 G/DL
IMM GRANULOCYTES NFR BLD AUTO: 0.3 %
IRON SATN MFR SERPL: 38 %
IRON SERPL-MCNC: 129 UG/DL
LDLC SERPL CALC-MCNC: 66 MG/DL
LYMPHOCYTES # BLD AUTO: 1.93 K/UL
LYMPHOCYTES NFR BLD AUTO: 27 %
MAN DIFF?: NORMAL
MCHC RBC-ENTMCNC: 31.8 PG
MCHC RBC-ENTMCNC: 32.9 GM/DL
MCV RBC AUTO: 96.8 FL
MONOCYTES # BLD AUTO: 0.44 K/UL
MONOCYTES NFR BLD AUTO: 6.2 %
NEUTROPHILS # BLD AUTO: 4.48 K/UL
NEUTROPHILS NFR BLD AUTO: 62.7 %
NONHDLC SERPL-MCNC: 80 MG/DL
PLATELET # BLD AUTO: 230 K/UL
POTASSIUM SERPL-SCNC: 4.3 MMOL/L
PROT SERPL-MCNC: 6.9 G/DL
RBC # BLD: 4.37 M/UL
RBC # FLD: 12.1 %
SODIUM SERPL-SCNC: 141 MMOL/L
TIBC SERPL-MCNC: 337 UG/DL
TRIGL SERPL-MCNC: 69 MG/DL
TSH SERPL-ACNC: 0.97 UIU/ML
UIBC SERPL-MCNC: 208 UG/DL
WBC # FLD AUTO: 7.14 K/UL

## 2022-09-22 ENCOUNTER — NON-APPOINTMENT (OUTPATIENT)
Age: 37
End: 2022-09-22

## 2022-09-22 ENCOUNTER — EMERGENCY (EMERGENCY)
Facility: HOSPITAL | Age: 37
LOS: 1 days | Discharge: HOME | End: 2022-09-22
Attending: EMERGENCY MEDICINE | Admitting: EMERGENCY MEDICINE

## 2022-09-22 VITALS
HEIGHT: 63 IN | TEMPERATURE: 98 F | HEART RATE: 65 BPM | DIASTOLIC BLOOD PRESSURE: 68 MMHG | SYSTOLIC BLOOD PRESSURE: 124 MMHG | OXYGEN SATURATION: 100 % | RESPIRATION RATE: 16 BRPM

## 2022-09-22 VITALS
HEART RATE: 64 BPM | SYSTOLIC BLOOD PRESSURE: 121 MMHG | OXYGEN SATURATION: 100 % | RESPIRATION RATE: 18 BRPM | DIASTOLIC BLOOD PRESSURE: 76 MMHG | TEMPERATURE: 98 F

## 2022-09-22 DIAGNOSIS — Z86.018 PERSONAL HISTORY OF OTHER BENIGN NEOPLASM: Chronic | ICD-10-CM

## 2022-09-22 LAB
ALBUMIN SERPL ELPH-MCNC: 4.1 G/DL — SIGNIFICANT CHANGE UP (ref 3.3–5)
ALP SERPL-CCNC: 37 U/L — LOW (ref 40–120)
ALT FLD-CCNC: 11 U/L — SIGNIFICANT CHANGE UP (ref 4–33)
ANION GAP SERPL CALC-SCNC: 11 MMOL/L — SIGNIFICANT CHANGE UP (ref 7–14)
APPEARANCE UR: ABNORMAL
APTT BLD: 28.2 SEC — SIGNIFICANT CHANGE UP (ref 27–36.3)
AST SERPL-CCNC: 18 U/L — SIGNIFICANT CHANGE UP (ref 4–32)
BACTERIA # UR AUTO: NEGATIVE — SIGNIFICANT CHANGE UP
BASOPHILS # BLD AUTO: 0.03 K/UL — SIGNIFICANT CHANGE UP (ref 0–0.2)
BASOPHILS NFR BLD AUTO: 0.4 % — SIGNIFICANT CHANGE UP (ref 0–2)
BILIRUB SERPL-MCNC: <0.2 MG/DL — SIGNIFICANT CHANGE UP (ref 0.2–1.2)
BILIRUB UR-MCNC: NEGATIVE — SIGNIFICANT CHANGE UP
BUN SERPL-MCNC: 16 MG/DL — SIGNIFICANT CHANGE UP (ref 7–23)
CALCIUM SERPL-MCNC: 9.5 MG/DL — SIGNIFICANT CHANGE UP (ref 8.4–10.5)
CHLORIDE SERPL-SCNC: 105 MMOL/L — SIGNIFICANT CHANGE UP (ref 98–107)
CO2 SERPL-SCNC: 25 MMOL/L — SIGNIFICANT CHANGE UP (ref 22–31)
COLOR SPEC: YELLOW — SIGNIFICANT CHANGE UP
CREAT SERPL-MCNC: 0.8 MG/DL — SIGNIFICANT CHANGE UP (ref 0.5–1.3)
D DIMER BLD IA.RAPID-MCNC: <150 NG/ML DDU — SIGNIFICANT CHANGE UP
DIFF PNL FLD: ABNORMAL
EGFR: 98 ML/MIN/1.73M2 — SIGNIFICANT CHANGE UP
EOSINOPHIL # BLD AUTO: 0.25 K/UL — SIGNIFICANT CHANGE UP (ref 0–0.5)
EOSINOPHIL NFR BLD AUTO: 3.3 % — SIGNIFICANT CHANGE UP (ref 0–6)
EPI CELLS # UR: 19 /HPF — HIGH (ref 0–5)
GLUCOSE SERPL-MCNC: 85 MG/DL — SIGNIFICANT CHANGE UP (ref 70–99)
GLUCOSE UR QL: NEGATIVE — SIGNIFICANT CHANGE UP
HCG SERPL-ACNC: <5 MIU/ML — SIGNIFICANT CHANGE UP
HCT VFR BLD CALC: 38.7 % — SIGNIFICANT CHANGE UP (ref 34.5–45)
HGB BLD-MCNC: 13.3 G/DL — SIGNIFICANT CHANGE UP (ref 11.5–15.5)
HYALINE CASTS # UR AUTO: 2 /LPF — SIGNIFICANT CHANGE UP (ref 0–7)
IANC: 4.49 K/UL — SIGNIFICANT CHANGE UP (ref 1.8–7.4)
IMM GRANULOCYTES NFR BLD AUTO: 0.3 % — SIGNIFICANT CHANGE UP (ref 0–0.9)
INR BLD: 1 RATIO — SIGNIFICANT CHANGE UP (ref 0.88–1.16)
KETONES UR-MCNC: ABNORMAL
LEUKOCYTE ESTERASE UR-ACNC: ABNORMAL
LYMPHOCYTES # BLD AUTO: 2.31 K/UL — SIGNIFICANT CHANGE UP (ref 1–3.3)
LYMPHOCYTES # BLD AUTO: 30.1 % — SIGNIFICANT CHANGE UP (ref 13–44)
MCHC RBC-ENTMCNC: 31.9 PG — SIGNIFICANT CHANGE UP (ref 27–34)
MCHC RBC-ENTMCNC: 34.4 GM/DL — SIGNIFICANT CHANGE UP (ref 32–36)
MCV RBC AUTO: 92.8 FL — SIGNIFICANT CHANGE UP (ref 80–100)
MONOCYTES # BLD AUTO: 0.57 K/UL — SIGNIFICANT CHANGE UP (ref 0–0.9)
MONOCYTES NFR BLD AUTO: 7.4 % — SIGNIFICANT CHANGE UP (ref 2–14)
NEUTROPHILS # BLD AUTO: 4.49 K/UL — SIGNIFICANT CHANGE UP (ref 1.8–7.4)
NEUTROPHILS NFR BLD AUTO: 58.5 % — SIGNIFICANT CHANGE UP (ref 43–77)
NITRITE UR-MCNC: NEGATIVE — SIGNIFICANT CHANGE UP
NRBC # BLD: 0 /100 WBCS — SIGNIFICANT CHANGE UP (ref 0–0)
NRBC # FLD: 0 K/UL — SIGNIFICANT CHANGE UP (ref 0–0)
PH UR: 6.5 — SIGNIFICANT CHANGE UP (ref 5–8)
PLATELET # BLD AUTO: 203 K/UL — SIGNIFICANT CHANGE UP (ref 150–400)
POTASSIUM SERPL-MCNC: 3.8 MMOL/L — SIGNIFICANT CHANGE UP (ref 3.5–5.3)
POTASSIUM SERPL-SCNC: 3.8 MMOL/L — SIGNIFICANT CHANGE UP (ref 3.5–5.3)
PROT SERPL-MCNC: 6.7 G/DL — SIGNIFICANT CHANGE UP (ref 6–8.3)
PROT UR-MCNC: ABNORMAL
PROTHROM AB SERPL-ACNC: 11.6 SEC — SIGNIFICANT CHANGE UP (ref 10.5–13.4)
RBC # BLD: 4.17 M/UL — SIGNIFICANT CHANGE UP (ref 3.8–5.2)
RBC # FLD: 11.9 % — SIGNIFICANT CHANGE UP (ref 10.3–14.5)
RBC CASTS # UR COMP ASSIST: 2 /HPF — SIGNIFICANT CHANGE UP (ref 0–4)
SODIUM SERPL-SCNC: 141 MMOL/L — SIGNIFICANT CHANGE UP (ref 135–145)
SP GR SPEC: 1.04 — SIGNIFICANT CHANGE UP (ref 1.01–1.05)
UROBILINOGEN FLD QL: SIGNIFICANT CHANGE UP
WBC # BLD: 7.67 K/UL — SIGNIFICANT CHANGE UP (ref 3.8–10.5)
WBC # FLD AUTO: 7.67 K/UL — SIGNIFICANT CHANGE UP (ref 3.8–10.5)
WBC UR QL: 8 /HPF — HIGH (ref 0–5)

## 2022-09-22 PROCEDURE — 99285 EMERGENCY DEPT VISIT HI MDM: CPT

## 2022-09-22 PROCEDURE — 71046 X-RAY EXAM CHEST 2 VIEWS: CPT | Mod: 26

## 2022-09-22 RX ORDER — ONDANSETRON 8 MG/1
1 TABLET, FILM COATED ORAL
Qty: 3 | Refills: 0
Start: 2022-09-22

## 2022-09-22 RX ORDER — MORPHINE SULFATE 50 MG/1
2 CAPSULE, EXTENDED RELEASE ORAL ONCE
Refills: 0 | Status: DISCONTINUED | OUTPATIENT
Start: 2022-09-22 | End: 2022-09-22

## 2022-09-22 RX ORDER — CEFPODOXIME PROXETIL 100 MG
1 TABLET ORAL
Qty: 20 | Refills: 0
Start: 2022-09-22 | End: 2022-10-01

## 2022-09-22 RX ORDER — SODIUM CHLORIDE 9 MG/ML
1000 INJECTION INTRAMUSCULAR; INTRAVENOUS; SUBCUTANEOUS ONCE
Refills: 0 | Status: COMPLETED | OUTPATIENT
Start: 2022-09-22 | End: 2022-09-22

## 2022-09-22 RX ORDER — CEFTRIAXONE 500 MG/1
1000 INJECTION, POWDER, FOR SOLUTION INTRAMUSCULAR; INTRAVENOUS ONCE
Refills: 0 | Status: COMPLETED | OUTPATIENT
Start: 2022-09-22 | End: 2022-09-22

## 2022-09-22 RX ADMIN — CEFTRIAXONE 100 MILLIGRAM(S): 500 INJECTION, POWDER, FOR SOLUTION INTRAMUSCULAR; INTRAVENOUS at 05:07

## 2022-09-22 RX ADMIN — SODIUM CHLORIDE 1000 MILLILITER(S): 9 INJECTION INTRAMUSCULAR; INTRAVENOUS; SUBCUTANEOUS at 03:11

## 2022-09-22 RX ADMIN — MORPHINE SULFATE 2 MILLIGRAM(S): 50 CAPSULE, EXTENDED RELEASE ORAL at 03:11

## 2022-09-22 NOTE — ED ADULT NURSE NOTE - OBJECTIVE STATEMENT
Pt presents with c/p left flank pain started today and dysura x 4 days. Pt explains woke up today with left flank pain, worse with certain position changes, taking Tylenol and ibuprofen with mild relief, denies fever chills

## 2022-09-22 NOTE — ED ADULT TRIAGE NOTE - CHIEF COMPLAINT QUOTE
pt c/o left flank pain, urinary frequency and dysuria. endorses urinary symptoms x 5 days but flank pain starting today. denies PMHx. has been taking tylenol and motrin around the clock.

## 2022-09-22 NOTE — ED PROVIDER NOTE - NSFOLLOWUPINSTRUCTIONS_ED_ALL_ED_FT
Take antibiotics as prescribed.  Take pain and nausea medication as needed.  Return to the ER if you are unable to keep down the antibiotics, if you have continued fever, vomiting despite the medication, or other concerns.  Follow up with your doctor for recheck in 3-5 days.  If you do not have a doctor, follow up with one of the numbers listed.    Cefpodoxime was sent for antibiotics, please take as prescribed.    You may take the following for pain:  You can use 400-600mg Ibuprofen (such as motrin or advil) every 6 to 8 hours as needed for pain control.  Take ibuprofen with food or milk to lessen stomach upset.  This is an over-the-counter medication please respect the warnings on the label. All medications come with certain risks and side effects that you need to discuss with your doctor, especially if you are taking them for a prolonged period.  You can use 500-1000mg Tylenol every 6 hours for pain - as needed; maximal daily dose 3000mg.  This is an over-the-counter medications - please respect the warnings on the label. This medication come with certain risks and side effects that you need to discuss with your doctor, especially if you are taking it for a prolonged period.   Percocet was also sent to your pharmacy if needed for a stronger pain medication.    Zofran (ondansetron) 4mg was sent if needed for nausea.    PYELONEPHRITIS:You have been diagnosed with pyelonephritis, also known as a kidney infection.  Pyelonephritis is a bacterial infection in your kidneys. Symptoms include fever, chills, nausea and vomiting, back pain on one or both sides, and sometimes difficulty urinating. You may also feel very weak. The diagnosis is made based on your symptoms and a test of your urine.  Pyelonephritis most commonly occurs when a lower urinary tract infection (bladder infection) ascends (climbs up) the urinary tract and gets into the kidney. Early treatment of a bladder infection is important to prevent pyelonephritis and the possibility of kidney damage.  Pyelonephritis is treated with antibiotics, pain and fever medications, and fluids. Some patients require an "IV" if they have nausea or vomiting and cannot keep down the antibiotics, or if they aren¬ít improving after 2-3 days of oral (by mouth) medications.  Most patients do not need to be admitted to the hospital for pyelonephritis.  A few patients who don’t do well with the oral (by mouth) antibiotics or become sicker despite treatment may need to return to be admitted to the hospital.  YOU SHOULD SEEK MEDICAL ATTENTION IMMEDIATELY, EITHER HERE OR AT THE NEAREST EMERGENCY DEPARTMENT, IF ANY OF THE FOLLOWING OCCURS:  Failure to improve after 2-3 days of antibiotics. ·   You develop nausea or vomiting and are unable to keep down medications or fluids. ·   Increased weakness or lightheadedness. ·   You develop any progressive or worsening symptoms or any other concerns.

## 2022-09-22 NOTE — ED PROVIDER NOTE - PHYSICAL EXAMINATION
Vital signs reviewed.  CONSTITUTIONAL: Uncomfortable appearing  HEAD: Normocephalic; atraumatic  NECK: Trachea midline  CV: Normal S1, S2; extremities WWP  RESP: normal work of breathing; no stridor  ABD: soft, non-distended; non-tender.  +CVA tenderness L  MSK/EXT: no edema, no deformities  SKIN: Warm and dry as visualized  NEURO: A&O, appears non-focal  Psych: Appropriate mood and affect

## 2022-09-22 NOTE — ED PROVIDER NOTE - OBJECTIVE STATEMENT
37F pmh ADHD, depression who presents with 5 days urinary frequency, dysuria, and urinary urgency now with 1 day flank pain. She says pain is colicky, intermittently sharp in character, ranging from 5-10/10, left sided.  Has been taking motrin and tylenol around the clock so is uncertain if she has a fever.  Pain radiates up her back, is worse with deep inspiration.    specifically denies headache, sore throat, chest pain, shortness of breath, cough, abdominal pain, nausea, vomiting, constipation, diarrhea, or lower extremity edema.

## 2022-09-22 NOTE — ED PROVIDER NOTE - ATTENDING CONTRIBUTION TO CARE
Attending Statement: I have personally seen and examined this patient. I have fully participated in the care of this patient. I have reviewed all pertinent clinical information, including history physical exam, plan and the Resident's note and agree except as noted  37yoF hx of depression, hx of ADHD pw left upper back pain x 5 days. Pain worse w deep breath, laying down. Constant sharp pain. no chest pain. no SOB, painful to breath in. no n/v/d no fever. +dysuria and urgency x 5 days, not on abx. no fall or trauma. Vital signs noted. sitting up, looks uncomfortable, No resp distress. able to speak in full and clear sentences. no wheeze, rales or stridor. tender on the left upper back, no ecchymosis.  soft nontender abdomen. no  rebound. no guarding. no sign of trauma. no CVAT plan labs, cxr, ua, pain med, re assess

## 2022-09-22 NOTE — ED PROVIDER NOTE - NSICDXPASTMEDICALHX_GEN_ALL_CORE_FT
PAST MEDICAL HISTORY:  Depression Hx of xanax and lexapro - d/c meds 5 years ago     (normal spontaneous vaginal delivery) 2017 (FT F) & 2019 (FT F - hx of possible TIA)    SAB (spontaneous ) without D&C

## 2022-09-22 NOTE — ED PROVIDER NOTE - PATIENT PORTAL LINK FT
You can access the FollowMyHealth Patient Portal offered by [facility] by registering at the following website: [email or web address]. By joining [employer]’s FollowMyHealth portal, you will also be able to view your health information using other applications (apps) compatible with our system.

## 2022-09-22 NOTE — ED PROVIDER NOTE - CLINICAL SUMMARY MEDICAL DECISION MAKING FREE TEXT BOX
37F pmh adhd, depression who presents with 5 days urinary sx, 1 day flank pain. The differential diagnosis includes but is not limited to pyelonephritis, complicated UTI.  Will get labs, urinalysis, likely abx and anticipate dc with PO abx.

## 2022-09-22 NOTE — ED PROVIDER NOTE - PROGRESS NOTE DETAILS
Patient is PERC negative. pt more comfortable, dimer neg. ua +UTI, +blood pt states she started menstrual cycle. will tx abx pt stable and declined to stay in CDU for pain control and IV abx. plan dc home w po meds. strict return instructions given.  plan dc home w po abx. pt request pain med prn.

## 2022-09-23 ENCOUNTER — EMERGENCY (EMERGENCY)
Facility: HOSPITAL | Age: 37
LOS: 1 days | Discharge: HOME | End: 2022-09-23
Attending: STUDENT IN AN ORGANIZED HEALTH CARE EDUCATION/TRAINING PROGRAM
Payer: COMMERCIAL

## 2022-09-23 VITALS
HEIGHT: 62 IN | RESPIRATION RATE: 18 BRPM | HEART RATE: 87 BPM | OXYGEN SATURATION: 100 % | DIASTOLIC BLOOD PRESSURE: 76 MMHG | TEMPERATURE: 99 F | SYSTOLIC BLOOD PRESSURE: 123 MMHG | WEIGHT: 139.99 LBS

## 2022-09-23 PROCEDURE — 99285 EMERGENCY DEPT VISIT HI MDM: CPT | Mod: 25

## 2022-09-23 PROCEDURE — 93010 ELECTROCARDIOGRAM REPORT: CPT

## 2022-09-23 RX ORDER — LIDOCAINE 4 G/100G
1 CREAM TOPICAL ONCE
Refills: 0 | Status: COMPLETED | OUTPATIENT
Start: 2022-09-23 | End: 2022-09-23

## 2022-09-23 RX ORDER — KETOROLAC TROMETHAMINE 30 MG/ML
15 SYRINGE (ML) INJECTION ONCE
Refills: 0 | Status: DISCONTINUED | OUTPATIENT
Start: 2022-09-23 | End: 2022-09-23

## 2022-09-23 RX ADMIN — Medication 15 MILLIGRAM(S): at 23:57

## 2022-09-23 NOTE — ED PROVIDER NOTE - NSFOLLOWUPINSTRUCTIONS_ED_ALL_ED_FT
YOU WERE SEEN IN THE ED FOR: chest pain    YOUR CT SCAN SHOWED FLUID ON THE LUNG ON THE LEFT.    YOU WERE PRESCRIBED: NAPROXSYN  FOLLOW THE INSTRUCTIONS ON THE LABEL/CONTAINER    FOR PAIN/FEVER, YOU MAY TAKE TYLENOL (acetaminophen). FOLLOW THE INSTRUCTIONS ON THE LABEL/CONTAINER.    PLEASE FOLLOW UP WITH YOUR PRIVATE PHYSICIAN WITHIN THE NEXT 48 HOURS. BRING COPIES OF YOUR RESULTS.    PLEASE FOLLOW UP WITH pulmonology WITHIN 1 WEEK. INFORMATION TO CALL AND MAKE AN APPOINTMENT IS PROVIDED.     RETURN TO THE EMERGENCY DEPARTMENT IF YOU EXPERIENCE ANY NEW/CONCERNING/WORSENING SYMPTOMS. YOU WERE SEEN IN THE ED FOR: chest pain    YOUR CT SCAN SHOWED FLUID ON THE LUNG ON THE LEFT.    YOU WERE PRESCRIBED: NAPROXSYN  FOLLOW THE INSTRUCTIONS ON THE LABEL/CONTAINER    FOR PAIN/FEVER, YOU MAY TAKE TYLENOL (acetaminophen). FOLLOW THE INSTRUCTIONS ON THE LABEL/CONTAINER.    PLEASE FOLLOW UP WITH YOUR PRIVATE PHYSICIAN WITHIN THE NEXT 48 HOURS. BRING COPIES OF YOUR RESULTS.    PLEASE FOLLOW UP WITH pulmonology WITHIN 1 WEEK. INFORMATION TO CALL AND MAKE AN APPOINTMENT IS PROVIDED.     RETURN TO THE EMERGENCY DEPARTMENT IF YOU EXPERIENCE ANY NEW/CONCERNING/WORSENING SYMPTOMS.      Pleural Effusion    WHAT YOU NEED TO KNOW:    What is pleural effusion? Pleural effusion is fluid buildup in the space between the layers of the pleura. The pleura is a thin piece of tissue with 2 layers. One layer rests directly on the lungs. The other rests on the chest wall. There is normally a small amount of fluid between these layers. This fluid helps your lungs move easily when you breathe.  The Lungs         What causes pleural effusion?   •Heart and lung problems, such as heart failure or a pulmonary embolism (blockage of a blood vessel in the lungs)      •Lung infections such as bacterial pneumonia or tuberculosis (TB)      •Inflammation of the pleura (pleurisy), a trapped lung       •Problems with organs in your chest or abdomen, such as liver cancer, pancreatitis, or an injury       What are the signs and symptoms of pleural effusion?   •Cough, shortness of breath      •Breathing faster than usual      •Chest pain when you breathe in or cough      •Fever      How is pleural effusion diagnosed? Your healthcare provider will examine you and listen to your heart and lungs through a stethoscope. You may need any of the following:   •Blood tests may show signs of an infection.      •An x-ray, ultrasound, or CT may show fluid around your lungs, an enlarged heart, or signs of infection. You may be given contrast liquid to help your lungs show up better in the pictures. Tell the healthcare provider if you have ever had an allergic reaction to contrast liquid.      •A thoracentesis is a procedure to take fluid out of your chest through a needle put between your ribs. The fluid may be sent to a lab for tests.      How is pleural effusion treated? Treatment depends on the cause of your pleural effusion and your symptoms. You may need any of the following:   •Cardiac medicines may be needed if your pleural effusion is caused by heart failure.      •Antibiotics help treat an infection caused by bacteria.      •NSAIDs help decrease swelling and pain or fever. This medicine is available with or without a doctor's order. NSAIDs can cause stomach bleeding or kidney problems in certain people. If you take blood thinner medicine, always ask your healthcare provider if NSAIDs are safe for you. Always read the medicine label and follow directions.      •Prescription pain medicine may be given. Ask your healthcare provider how to take this medicine safely. Some prescription pain medicines contain acetaminophen. Do not take other medicines that contain acetaminophen without talking to your healthcare provider. Too much acetaminophen may cause liver damage. Prescription pain medicine may cause constipation. Ask your healthcare provider how to prevent or treat constipation.       •Chemotherapy may be needed if your pleural effusion is caused by cancer.       •Steroids,or other types of medicines, may be given to decrease swelling.      •Drainage of extra pleural fluid may be done using thoracentesis or a chest tube. A chest tube may stay in your chest for days or weeks. This allows the extra fluid around your lungs to drain over time. You may need medicines put directly into your chest if the fluid does not drain out easily.      •Surgery may be needed if your pleural effusion keeps coming back or if it increases your risk for other problems.      How can I prevent another pleural effusion? Maintain a healthy lifestyle:  •Eat a variety of healthy foods. Healthy foods help with overall health. Healthy foods include fruit, vegetables, whole-grain breads, low-fat dairy products, beans, lean meat, and fish. Limit sugar, alcohol, and fat.       •Do not smoke and do not allow others to smoke around you. Nicotine and other chemicals in cigarettes and cigars increase your risk for lung infections such as pneumonia. Ask your healthcare provider for information if you currently smoke and need help to quit. E-cigarettes or smokeless tobacco still contain nicotine. Talk to your healthcare provider before you use these products.      •Drink liquids as directed and rest as needed. Liquids help keep your air passages moist. This can help your body get rid of germs and other irritants. Ask your healthcare provider how much liquid to drink each day and which liquids are best for you. You may feel like resting more. Slowly start to do more each day. Rest when you feel it is needed.      •Exercise regularly. Ask about the best exercise plan for you. Exercise will lower your blood pressure and decrease stress. This helps decrease your risk for another pleural effusion, or a lung infection.       Call your local emergency number (911 in the [place]) if:   •You find it very hard to breathe.      •You feel faint, or you cannot think clearly.      When should I seek immediate care?   •Your breathing problems do not go away, or they get worse.          When should I call my doctor?   •Your lips or fingernails turn blue.      •You have a fever.      •Your pain does not go away or gets worse.      •You cough up yellow, green, gray, or bloody mucus.      •You have questions or concerns about your condition or care.      CARE AGREEMENT:    You have the right to help plan your care. Learn about your health condition and how it may be treated. Discuss treatment options with your healthcare providers to decide what care you want to receive. You always have the right to refuse treatment.

## 2022-09-23 NOTE — ED PROVIDER NOTE - OBJECTIVE STATEMENT
Ric HANDY MRN# [id number]     38 y/o F with PMHx of UTI presents to the ED for worsening chest pain and difficulty in breathing since wednesday morning (09/21/2022). Pt reports when she "takes a breath, it hurts." Pt reports had visited [facility] yesterday for same sx and was told to come to the ED if pain worsens. Denies smoking, BCPs or any other acute complaints. Pt took pain medication (Ibuprofen) this afternoon. Pt appears to be in no acute distress.     Betty MURRAY (Scribe) have documented this rapid assessment note under the dictation of Mamadou Pierce (PA) which has been reviewed and affirmed to be accurate. Patient was seen as a QPA patient. The patient will be seen and further worked up in the main emergency department and their care will be completed by the main emergency department team along with a thorough physical exam. Receiving team will follow up on labs, analgesia, any clinical imaging, reassess and disposition as clinically indicated, all decisions regarding the progression of care will be made at their discretion. 36 y/o F with PMH of anxiety and depression presenting with chest pain since Wednesday AM (9/21/22). Pt reports that her pain started initially in her L abdomen/flank area and radiated up to her chest area. Recently seen at [facility] on 9/22 AM for complaints of dysuria, diagnosed with UTI/possible pyelo and d/regina on cefpodoxime 500 mg q12, has taken 1 pill so far and reports improvement in urinary symptoms but reports pain in L chest area. Pain is 5/10, gets worse at times to 8/10, is constant, aggravated by laying flat and movement, also worsens when breathing in deeply. Radiates to L shoulder but not neck or arm/hand. Pt denies fever, chills, nausea, vomiting, diarrhea, constipation, current dysuria.

## 2022-09-23 NOTE — ED ADULT TRIAGE NOTE - CHIEF COMPLAINT QUOTE
Pt c/o "CP radiating to lateral side worse with deep breaths. +UTI dx at [facility]- had the pain to left side on flank 1st but now moving up to chest/breast bone. On ABX"

## 2022-09-23 NOTE — ED PROVIDER NOTE - PATIENT PORTAL LINK FT
You can access the FollowMyHealth Patient Portal offered by [facility] by registering at the following website: [email or web address]. By joining [employer]’s FollowMyHealth portal, you will also be able to view your health information using other applications (apps) compatible with our system.

## 2022-09-23 NOTE — ED PROVIDER NOTE - CARDIOVASCULAR [-], MLM
[facility]  Nephrology  Progress Note    Patient Name: Sisi Medel  MRN: [id number]  Admission Date: 8/3/2019  Hospital Length of Stay: 2 days  Attending Provider: Jules Robertson MD   Primary Care Physician: Carlos A Caputo MD  Principal Problem:Acute on chronic respiratory failure with hypoxia and hypercapnia    Subjective:     Interval History: Pt on SCUF this am. However, machine clotted and blood volume in set was lost. Per radiology, CXR this morning with concern for pulmonary edema, aspiration pneumonia, and/or sepsis. Pt continues to require ventilatory support and currently requiring FiO2 of 50%, and sedated on propofol. BP's averaging around 100/70 with pressor support; on 0.06mcg/kg this am. Renal labs today significant for sodium of 128. Pt is roughly net even this admission and was tolerating hourly UF rate of 300-400cc this am, before clotting.     Review of patient's allergies indicates:   Allergen Reactions    Bactrim [sulfamethoxazole-trimethoprim] Other (See Comments)     Causes renal failure    Nsaids (non-steroidal anti-inflammatory drug) Other (See Comments)     Renal failure     Current Facility-Administered Medications   Medication Frequency    0.9%  NaCl infusion (CRRT USE ONLY) Continuous    acetaminophen tablet 650 mg Q4H PRN    albuterol-ipratropium 2.5 mg-0.5 mg/3 mL nebulizer solution 3 mL Q6H PRN    chlorhexidine 0.12 % solution 15 mL BID    cinacalcet tablet 30 mg Daily with breakfast    clopidogrel tablet 75 mg Daily    dextrose 10% (D10W) Bolus PRN    dextrose 10% (D10W) Bolus PRN    dextrose 50% injection 25 g PRN    fentaNYL injection 50 mcg Q1H PRN    [START ON 8/6/2019] fludrocortisone tablet 100 mcg Daily    glucagon (human recombinant) injection 1 mg PRN    glucose chewable tablet 16 g PRN    glucose chewable tablet 24 g PRN    heparin 25,000 units in dextrose 5% (100 units/ml) IV bolus from bag - ADDITIONAL PRN BOLUS - 30  units/kg PRN    heparin 25,000 units in dextrose 5% (100 units/ml) IV bolus from bag - ADDITIONAL PRN BOLUS - 60 units/kg PRN    heparin 25,000 units in dextrose 5% 250 mL (100 units/mL) infusion LOW INTENSITY nomogram - [facility] Continuous    hydrocortisone sodium succinate injection 100 mg Q8H    insulin aspart U-100 pen 0-5 Units QID (AC + HS) PRN    levETIRAcetam tablet 500 mg BID    LORazepam tablet 0.5 mg Q8H PRN    magnesium sulfate 2g in water 50mL IVPB (premix) PRN    miconazole NITRATE 2 % top powder BID    midodrine tablet 10 mg TID    midodrine tablet 10 mg Once    norepinephrine 4 mg in dextrose 5% 250 mL infusion (premix) (titrating) Continuous    ondansetron disintegrating tablet 8 mg Q8H PRN    [START ON 8/6/2019] pantoprazole suspension 40 mg Daily    piperacillin-tazobactam 4.5 g in sodium chloride 0.9% 100 mL IVPB (ready to mix system) Q12H    prednisoLONE acetate 1 % ophthalmic suspension 1 drop TID    propofol (DIPRIVAN) 10 mg/mL infusion Continuous    senna-docusate 8.6-50 mg per tablet 1 tablet BID    sevelamer carbonate tablet 800 mg Daily with dinner    sodium chloride 0.9% flush 10 mL PRN    sodium phosphate 20.01 mmol in dextrose 5 % 250 mL IVPB PRN    sodium phosphate 30 mmol in dextrose 5 % 250 mL IVPB PRN    sodium phosphate 39.99 mmol in dextrose 5 % 250 mL IVPB PRN    tiZANidine tablet 4 mg Nightly PRN     Facility-Administered Medications Ordered in Other Encounters   Medication Frequency    ceFAZolin injection 2 g On Call Procedure    lidocaine (PF) 10 mg/ml (1%) injection 10 mg Once    mupirocin 2 % ointment On Call Procedure    sodium chloride 0.9% flush 10 mL PRN       Objective:     Vital Signs (Most Recent):  Temp: (!) 90.7 °F (32.6 °C) (08/05/19 1200)  Pulse: 71 (08/05/19 1200)  Resp: (!) 23 (08/05/19 0740)  BP: 107/76 (08/05/19 1100)  SpO2: 100 % (08/05/19 1200)  O2 Device (Oxygen Therapy): ventilator (08/05/19 1200) Vital Signs (24h Range):  Temp:   [87.3 °F (30.7 °C)-97.8 °F (36.6 °C)] 90.7 °F (32.6 °C)  Pulse:  [] 71  Resp:  [22-36] 23  SpO2:  [84 %-100 %] 100 %  BP: ()/(48-76) 107/76  Arterial Line BP: ()/(56-68) 94/60     Weight: 112.7 kg (248 lb 7.3 oz) (08/05/19 0500)  Body mass index is 40.1 kg/m².  Body surface area is 2.29 meters squared.    I/O last 3 completed shifts:  In: 4547.5 [I.V.:4267.5; NG/GT:280]  Out: 4347 [Drains:250; Other:4097]    Physical Exam   Constitutional: She is oriented to person, place, and time. She appears well-developed and well-nourished. No distress.   HENT:   Head: Normocephalic and atraumatic.   Eyes: Pupils are equal, round, and reactive to light. Conjunctivae and EOM are normal.   Neck: Normal range of motion. Neck supple.   Cardiovascular: Normal rate, regular rhythm and normal heart sounds.   Pulmonary/Chest: Effort normal. No stridor. No respiratory distress. She has no wheezes. She has rales.   Abdominal: Soft. Bowel sounds are normal.   Musculoskeletal: Normal range of motion. She exhibits edema. She exhibits no tenderness or deformity.   Neurological: She is alert and oriented to person, place, and time.   Skin: Skin is warm and dry. She is not diaphoretic.   Psychiatric:   MAGNUS, Pt intubated/sedated       Significant Labs:  ABGs:   Recent Labs   Lab 08/05/19  1055   PH 7.289*   PCO2 51.1*   HCO3 24.5   POCSATURATED 96   BE -2     CBC:   Recent Labs   Lab 08/05/19  1120   WBC 9.85   RBC 3.58*   HGB 9.1*   HCT 31.9*      MCV 89   MCH 25.4*   MCHC 28.5*     CMP:   Recent Labs   Lab 08/05/19  0300   *  200*   CALCIUM 8.3*  8.3*   ALBUMIN 2.6*  2.6*   PROT 6.6   *  128*   K 4.0  4.0   CO2 23  23   CL 90*  90*   BUN 25*  25*   CREATININE 3.7*  3.7*   ALKPHOS 109   ALT 16   AST 29   BILITOT 1.6*     All labs within the past 24 hours have been reviewed.         Assessment/Plan:     * Acute on chronic respiratory failure with hypoxia and hypercapnia  Managed by primary team      ESRD on hemodialysis  Outpatient HD unit: [facility]   -Nephrologist: Patient does not know  -HD tx days: TThS  -HD tx time: 4hrs 45mins    -HD access: LUE AVG  -HD modality: iHD  -Residual urine: Minimal  -EDW: 109 kg     Plan:  - Transition to SLED   - Hourly -400cc  - Na+ set to 138   - Pt on heparin infusion, no need for citrate   - Strict I/Os and chart  - Will follow closely        Thank you for your consult. I will follow-up with patient. Please contact us if you have any additional questions.    Dinh Rodriguez NP  Nephrology  [facility]   no palpitations/no peripheral edema/no syncope

## 2022-09-23 NOTE — ED PROVIDER NOTE - PROGRESS NOTE DETAILS
Attending (Josh Agarwal D.O.):  CTA showing left pleural effusion with some atelectasis. No pleural effusion. EKG w/o findings suggestive of pericarditis. Trop neg. Labs otherwise nonactionable. Pain somewhat improved in Ed. 20mg lasix dosed to asssit. Hemodyanmically stable. Results endorsed including unexpected incidental findings (copy of reports provided to patient). Return precautions given. Patient expressed verbal understanding. All questions answered.  Will DC with outpatient follow-up.

## 2022-09-23 NOTE — ED PROVIDER NOTE - RAPID ASSESSMENT
Ric HANDY MRN# [id number]     36 y/o F with PMHx of UTI presents to the ED for worsening chest pain and difficulty in breathing since wednesday morning (09/21/2022). Pt reports when she "takes a breath, it hurts." Pt reports had visited [facility] yesterday for same sx and was told to come to the ED if pain worsens. Denies smoking, BCPs or any other acute complaints. Pt took pain medication (Ibuprofen) this afternoon. Pt appears to be in no acute distress.     Betty MURRAY (Scribe) have documented this rapid assessment note under the dictation of Mamadou Pierce (PA) which has been reviewed and affirmed to be accurate. Patient was seen as a QPA patient. The patient will be seen and further worked up in the main emergency department and their care will be completed by the main emergency department team along with a thorough physical exam. Receiving team will follow up on labs, analgesia, any clinical imaging, reassess and disposition as clinically indicated, all decisions regarding the progression of care will be made at their discretion.    Mamadou Pierce (PA) note: This scribe's documentation has been prepared under my direction and personally reviewed by me. The patient will be seen and further worked up in the main emergency department and their care will be completed by the main emergency department team along with a thorough physical exam. Receiving team will follow up on labs, analgesia, any clinical imaging, reassess and disposition as clinically indicated, all decisions regarding the progression of care will be made at their discretion.

## 2022-09-23 NOTE — ED PROVIDER NOTE - CARE PLAN
1 Principal Discharge DX:	Pleural effusion, left  Assessment and plan of treatment:	Pt came in with chest pain, no abnormalities on EKG, had CTA Chest showing small L pleural effusion, received Lasix 20 mg IV.

## 2022-09-23 NOTE — ED PROVIDER NOTE - CLINICAL SUMMARY MEDICAL DECISION MAKING FREE TEXT BOX
Attending (Josh Agarwal D.O.):  37F hx of ADHD here for atrumatic left sided anterior chest pain, sharp, intermittent, worse w/ movement left and right w/o sob, + pleuritic component but able to take full breath. Denies associated nausea, vomiting. Paion improved w/ ibuprofen. Given percocet but does not like side effects so stopped taking. Labs from [facility] from 2 days prior reviewed, w/ neg ddimer, CXR clear, otherwise labs nonactionable. Pt dx w/ uti, given cfx, started on cefpodoxime. Hemodynamically stable here. + mild left midaxillary and anterior chest wall tenderness, Clear lungs, no audible cardiac murmurs, no rash. No cva ttp. Benign abdomen. Patient not on OCPs, no prior CAD hx, no hx of VTE. Deneis prior uri sxs. Will repeat labs here to eval for objective anemia, vs worsening infection. Offered CXR but patient decliend as already had an xray which was noanctionable and no change in sxs. Will check ekg for cardiac conduction abnormality. Analgesia.

## 2022-09-23 NOTE — ED PROVIDER NOTE - ATTENDING CONTRIBUTION TO CARE
Attending (Josh Agarwal D.O.):  I have personally seen and examined this patient. I have performed a substantive portion of the visit including all aspects of the medical decision making. Resident, fellow, and/or ACP note reviewed. I agree on the plan of care except where noted.    see mdm

## 2022-09-23 NOTE — ED PROVIDER NOTE - NSFOLLOWUPCLINICS_GEN_ALL_ED_FT
[facility] Pulmonolgy and Sleep Medicine  Pulmonology  [address] [place] [address], [facility] [address]  [place] [address]  Phone: [phone]  Fax:   Follow Up Time: 1-3 Days

## 2022-09-23 NOTE — ED PROVIDER NOTE - PLAN OF CARE
Pt came in with chest pain, no abnormalities on EKG, had CTA Chest showing small L pleural effusion, received Lasix 20 mg IV.

## 2022-09-24 VITALS
DIASTOLIC BLOOD PRESSURE: 71 MMHG | SYSTOLIC BLOOD PRESSURE: 112 MMHG | RESPIRATION RATE: 16 BRPM | HEART RATE: 73 BPM | OXYGEN SATURATION: 98 % | TEMPERATURE: 98 F

## 2022-09-24 LAB
ALBUMIN SERPL ELPH-MCNC: 4.2 G/DL — SIGNIFICANT CHANGE UP (ref 3.3–5)
ALP SERPL-CCNC: 41 U/L — SIGNIFICANT CHANGE UP (ref 40–120)
ALT FLD-CCNC: 11 U/L — SIGNIFICANT CHANGE UP (ref 10–45)
ANION GAP SERPL CALC-SCNC: 13 MMOL/L — SIGNIFICANT CHANGE UP (ref 5–17)
AST SERPL-CCNC: 11 U/L — SIGNIFICANT CHANGE UP (ref 10–40)
BASOPHILS # BLD AUTO: 0.03 K/UL — SIGNIFICANT CHANGE UP (ref 0–0.2)
BASOPHILS NFR BLD AUTO: 0.4 % — SIGNIFICANT CHANGE UP (ref 0–2)
BILIRUB SERPL-MCNC: 0.2 MG/DL — SIGNIFICANT CHANGE UP (ref 0.2–1.2)
BUN SERPL-MCNC: 11 MG/DL — SIGNIFICANT CHANGE UP (ref 7–23)
CALCIUM SERPL-MCNC: 9.8 MG/DL — SIGNIFICANT CHANGE UP (ref 8.4–10.5)
CHLORIDE SERPL-SCNC: 103 MMOL/L — SIGNIFICANT CHANGE UP (ref 96–108)
CO2 SERPL-SCNC: 25 MMOL/L — SIGNIFICANT CHANGE UP (ref 22–31)
CREAT SERPL-MCNC: 0.67 MG/DL — SIGNIFICANT CHANGE UP (ref 0.5–1.3)
EGFR: 117 ML/MIN/1.73M2 — SIGNIFICANT CHANGE UP
EOSINOPHIL # BLD AUTO: 0.2 K/UL — SIGNIFICANT CHANGE UP (ref 0–0.5)
EOSINOPHIL NFR BLD AUTO: 2.6 % — SIGNIFICANT CHANGE UP (ref 0–6)
GLUCOSE SERPL-MCNC: 108 MG/DL — HIGH (ref 70–99)
HCT VFR BLD CALC: 40.3 % — SIGNIFICANT CHANGE UP (ref 34.5–45)
HGB BLD-MCNC: 14.3 G/DL — SIGNIFICANT CHANGE UP (ref 11.5–15.5)
IMM GRANULOCYTES NFR BLD AUTO: 0.4 % — SIGNIFICANT CHANGE UP (ref 0–0.9)
LYMPHOCYTES # BLD AUTO: 2.23 K/UL — SIGNIFICANT CHANGE UP (ref 1–3.3)
LYMPHOCYTES # BLD AUTO: 29 % — SIGNIFICANT CHANGE UP (ref 13–44)
MCHC RBC-ENTMCNC: 33.3 PG — SIGNIFICANT CHANGE UP (ref 27–34)
MCHC RBC-ENTMCNC: 35.5 GM/DL — SIGNIFICANT CHANGE UP (ref 32–36)
MCV RBC AUTO: 93.7 FL — SIGNIFICANT CHANGE UP (ref 80–100)
MONOCYTES # BLD AUTO: 0.5 K/UL — SIGNIFICANT CHANGE UP (ref 0–0.9)
MONOCYTES NFR BLD AUTO: 6.5 % — SIGNIFICANT CHANGE UP (ref 2–14)
NEUTROPHILS # BLD AUTO: 4.7 K/UL — SIGNIFICANT CHANGE UP (ref 1.8–7.4)
NEUTROPHILS NFR BLD AUTO: 61.1 % — SIGNIFICANT CHANGE UP (ref 43–77)
NRBC # BLD: 0 /100 WBCS — SIGNIFICANT CHANGE UP (ref 0–0)
PLATELET # BLD AUTO: 236 K/UL — SIGNIFICANT CHANGE UP (ref 150–400)
POTASSIUM SERPL-MCNC: 3.9 MMOL/L — SIGNIFICANT CHANGE UP (ref 3.5–5.3)
POTASSIUM SERPL-SCNC: 3.9 MMOL/L — SIGNIFICANT CHANGE UP (ref 3.5–5.3)
PROT SERPL-MCNC: 6.8 G/DL — SIGNIFICANT CHANGE UP (ref 6–8.3)
RBC # BLD: 4.3 M/UL — SIGNIFICANT CHANGE UP (ref 3.8–5.2)
RBC # FLD: 11.8 % — SIGNIFICANT CHANGE UP (ref 10.3–14.5)
SODIUM SERPL-SCNC: 141 MMOL/L — SIGNIFICANT CHANGE UP (ref 135–145)
TROPONIN T, HIGH SENSITIVITY RESULT: <6 NG/L — SIGNIFICANT CHANGE UP (ref 0–51)
WBC # BLD: 7.69 K/UL — SIGNIFICANT CHANGE UP (ref 3.8–10.5)
WBC # FLD AUTO: 7.69 K/UL — SIGNIFICANT CHANGE UP (ref 3.8–10.5)

## 2022-09-24 PROCEDURE — 85025 COMPLETE CBC W/AUTO DIFF WBC: CPT

## 2022-09-24 PROCEDURE — 71275 CT ANGIOGRAPHY CHEST: CPT | Mod: MA

## 2022-09-24 PROCEDURE — 99285 EMERGENCY DEPT VISIT HI MDM: CPT | Mod: 25

## 2022-09-24 PROCEDURE — 71275 CT ANGIOGRAPHY CHEST: CPT | Mod: 26,MA

## 2022-09-24 PROCEDURE — 80053 COMPREHEN METABOLIC PANEL: CPT

## 2022-09-24 PROCEDURE — 96375 TX/PRO/DX INJ NEW DRUG ADDON: CPT

## 2022-09-24 PROCEDURE — 96374 THER/PROPH/DIAG INJ IV PUSH: CPT | Mod: XU

## 2022-09-24 PROCEDURE — 36415 COLL VENOUS BLD VENIPUNCTURE: CPT

## 2022-09-24 PROCEDURE — 93005 ELECTROCARDIOGRAM TRACING: CPT

## 2022-09-24 PROCEDURE — 84484 ASSAY OF TROPONIN QUANT: CPT

## 2022-09-24 RX ORDER — FUROSEMIDE 40 MG
20 TABLET ORAL ONCE
Refills: 0 | Status: COMPLETED | OUTPATIENT
Start: 2022-09-24 | End: 2022-09-24

## 2022-09-24 RX ORDER — MORPHINE SULFATE 50 MG/1
2 CAPSULE, EXTENDED RELEASE ORAL ONCE
Refills: 0 | Status: DISCONTINUED | OUTPATIENT
Start: 2022-09-24 | End: 2022-09-24

## 2022-09-24 RX ADMIN — Medication 20 MILLIGRAM(S): at 05:17

## 2022-09-24 RX ADMIN — MORPHINE SULFATE 2 MILLIGRAM(S): 50 CAPSULE, EXTENDED RELEASE ORAL at 04:44

## 2022-09-24 RX ADMIN — LIDOCAINE 1 PATCH: 4 CREAM TOPICAL at 00:04

## 2022-09-24 NOTE — ED ADULT NURSE NOTE - CHIEF COMPLAINT QUOTE
Pt c/o "CP radiating to lateral side worse with deep breaths. +UTI dx at [facility]- had the pain to left side on flank 1st but now moving up to chest/breast bone. On ABX"

## 2022-09-24 NOTE — ED ADULT NURSE NOTE - OBJECTIVE STATEMENT
37 y.o F PMH depression and anxiety, presenting to the ED for left sided c/p that initially started in L abdomen/flank area x 2 days. Pt states that she was recently at [facility] on 9/22 for dysuria. Pt states that her urinary symptoms have improved with prescribed meds from [facility] but the abdominal/chest pain has gotten worse. Pt endorsing -pain radiating to the L shoulder and worse when lying flat/taking deep breaths. Aox3, MAEx4, distal pulses strong and regular, abd soft nontender/nondistended, skin warm dry and normal for race. Denies n/v/d, dizziness, weakness, fevers, chills. Bed in lowest position, wheels locked, appropriate side rails up. Safety maintained, comfort provided

## 2022-09-25 LAB
CULTURE RESULTS: SIGNIFICANT CHANGE UP
SPECIMEN SOURCE: SIGNIFICANT CHANGE UP

## 2022-10-17 ENCOUNTER — APPOINTMENT (OUTPATIENT)
Dept: PULMONOLOGY | Facility: CLINIC | Age: 37
End: 2022-10-17

## 2023-02-16 ENCOUNTER — APPOINTMENT (OUTPATIENT)
Dept: INTERNAL MEDICINE | Facility: CLINIC | Age: 38
End: 2023-02-16
Payer: COMMERCIAL

## 2023-02-16 VITALS
SYSTOLIC BLOOD PRESSURE: 102 MMHG | HEIGHT: 63 IN | HEART RATE: 67 BPM | WEIGHT: 145 LBS | BODY MASS INDEX: 25.69 KG/M2 | OXYGEN SATURATION: 96 % | RESPIRATION RATE: 16 BRPM | DIASTOLIC BLOOD PRESSURE: 60 MMHG

## 2023-02-16 DIAGNOSIS — R43.9 UNSPECIFIED DISTURBANCES OF SMELL AND TASTE: ICD-10-CM

## 2023-02-16 DIAGNOSIS — F90.9 ATTENTION-DEFICIT HYPERACTIVITY DISORDER, UNSPECIFIED TYPE: ICD-10-CM

## 2023-02-16 DIAGNOSIS — M54.9 DORSALGIA, UNSPECIFIED: ICD-10-CM

## 2023-02-16 DIAGNOSIS — R76.8 OTHER SPECIFIED ABNORMAL IMMUNOLOGICAL FINDINGS IN SERUM: ICD-10-CM

## 2023-02-16 DIAGNOSIS — M25.531 PAIN IN RIGHT WRIST: ICD-10-CM

## 2023-02-16 DIAGNOSIS — L65.9 NONSCARRING HAIR LOSS, UNSPECIFIED: ICD-10-CM

## 2023-02-16 DIAGNOSIS — R53.83 OTHER FATIGUE: ICD-10-CM

## 2023-02-16 DIAGNOSIS — G45.9 TRANSIENT CEREBRAL ISCHEMIC ATTACK, UNSPECIFIED: ICD-10-CM

## 2023-02-16 PROCEDURE — 99214 OFFICE O/P EST MOD 30 MIN: CPT

## 2023-02-16 RX ORDER — BUPROPION HYDROCHLORIDE 150 MG/1
150 TABLET, EXTENDED RELEASE ORAL
Refills: 0 | Status: ACTIVE | COMMUNITY
Start: 2023-02-16

## 2023-02-16 NOTE — HEALTH RISK ASSESSMENT
[No] : In the past 12 months have you used drugs other than those required for medical reasons? No [0] : 2) Feeling down, depressed, or hopeless: Not at all (0) [PHQ-2 Negative - No further assessment needed] : PHQ-2 Negative - No further assessment needed [Never] : Never [Audit-CScore] : 0 [[id number]] : 0

## 2023-02-16 NOTE — PHYSICAL EXAM
[No Acute Distress] : no acute distress [Well Nourished] : well nourished [Well Developed] : well developed [Well-Appearing] : well-appearing [Normal Voice/Communication] : normal voice/communication [Normal Sclera/Conjunctiva] : normal sclera/conjunctiva [PERRL] : pupils equal round and reactive to light [EOMI] : extraocular movements intact [Normal Outer Ear/Nose] : the outer ears and nose were normal in appearance [No JVD] : no jugular venous distention [No Lymphadenopathy] : no lymphadenopathy [Supple] : supple [Thyroid Normal, No Nodules] : the thyroid was normal and there were no nodules present [No Respiratory Distress] : no respiratory distress  [No Accessory Muscle Use] : no accessory muscle use [Clear to Auscultation] : lungs were clear to auscultation bilaterally [Normal Rate] : normal rate  [Regular Rhythm] : with a regular rhythm [Normal S1, S2] : normal S1 and S2 [No Murmur] : no murmur heard [No Carotid Bruits] : no carotid bruits [No Abdominal Bruit] : a ~M bruit was not heard ~T in the abdomen [Pedal Pulses Present] : the pedal pulses are present [No Edema] : there was no peripheral edema [No Palpable Aorta] : no palpable aorta [Soft] : abdomen soft [Non Tender] : non-tender [Non-distended] : non-distended [No Masses] : no abdominal mass palpated [No HSM] : no HSM [Normal Bowel Sounds] : normal bowel sounds [No CVA Tenderness] : no CVA  tenderness [No Spinal Tenderness] : no spinal tenderness [No Joint Swelling] : no joint swelling [Grossly Normal Strength/Tone] : grossly normal strength/tone [Coordination Grossly Intact] : coordination grossly intact [No Focal Deficits] : no focal deficits [Normal Gait] : normal gait [Speech Grossly Normal] : speech grossly normal [Normal Affect] : the affect was normal [Alert and Oriented x3] : oriented to person, place, and time [Normal Mood] : the mood was normal [Normal Insight/Judgement] : insight and judgment were intact [de-identified] : no tenderness [de-identified] : neg ovidio and phalen

## 2023-02-16 NOTE — HISTORY OF PRESENT ILLNESS
[FreeTextEntry1] : dysomnia, dysgusia, hair loss, fatigue, paronychia\par adhd [de-identified] : 38 y/o female here for f/u\par s/p covid infection.\par Residual sx have been continuing to improve. Saw ENT for the smell and taste sx. Has resolved.  \par Saw rheumatology for the positive ALLISON - reports that the w/u was negative.  Overdue for follow up.  \par Upper back pain has been improved. with some numbness and pain at the rt upper back medial to the scapula.  Stable.  \par Wrist sx have been better.  . \par Has been seeing therapist for anx/dep/adhd - considering adderall - has been taking wellbutrin - had covid and then flu. B/c she needed to miss f/u appointments she was d/c'd from [facility].  Has established with another clinic, but they do not give controlled meds. following with therapist there.  Has been continued on wellbutrin 150mg daily.  Had been on adderall 10mg.  Was last dispensed on 11/17/22 per the [facility]  - Had been taking the adderall 1-2 times while working.  Not taking the rx on the days that she was not working.  no noted adverse affects from the meds.  \par \par gyn - regular\par \par vaccinations - tet '19\par gets flu vaccine

## 2023-02-16 NOTE — REVIEW OF SYSTEMS
[Back Pain] : back pain [Negative] : Heme/Lymph [Fatigue] : no fatigue [de-identified] : as per HPI

## 2023-03-27 ENCOUNTER — APPOINTMENT (OUTPATIENT)
Dept: INTERNAL MEDICINE | Facility: CLINIC | Age: 38
End: 2023-03-27

## 2023-05-22 RX ORDER — DEXTROAMPHETAMINE SACCHARATE, AMPHETAMINE ASPARTATE, DEXTROAMPHETAMINE SULFATE AND AMPHETAMINE SULFATE 2.5; 2.5; 2.5; 2.5 MG/1; MG/1; MG/1; MG/1
10 TABLET ORAL
Qty: 60 | Refills: 0 | Status: ACTIVE | COMMUNITY
Start: 2023-02-16 | End: 1900-01-01

## 2023-06-08 NOTE — OB RN DELIVERY SUMMARY - NS_[facility]_OBGYN_ALL_OB
Physical Therapy Visit    Visit Type: Daily Treatment Note  Visit: 7  Referring Provider: Pb Jean DO  Medical Diagnosis (from order): Diagnosis Information    Diagnosis  V45.89 (ICD-9-CM) - Z98.890 (ICD-10-CM) - Post-operative state       Patient alert and oriented X3.    SUBJECTIVE                                                                                                               Worked the arm a little more yesterday with some yard work, felt good to move it around  -was cautious with the arm but used it a bit more   Felt fairly good last night, went to bed with the sling, got 4 hours of sleep, then around 3:30 took the sling off and then that's when the dull pain increases a bit -but not enough to need the sling  -without the support increases in achyness     Pain / Symptoms  - Pain rating (out of 10): Current: 1 (dull, not sharp)       OBJECTIVE                                                                                                                     Range of Motion (ROM)   (degrees unless noted; active unless noted; norms in ( ); negative=lacking to 0, positive=beyond 0)  Shoulder:    - Flexion (180):        • Left:120     - Scaption:        • Left: 135                       Treatment    Moist Heat (75002)  - Location: L shoulder   - Position: supine  - Temperature: 165° F  - Duration: 10 minutes    Results: tissue softening and improved range of motion  Reaction: no adverse reaction to treatment      Therapeutic Exercise  Supine active assisted range of motion with dowel assist x 10 each with cues for end range hold with pulse for joint mobilization  -press, flexion, scaption, horizontal abduction, external rotation     Doorway shoulder external rotation active assisted range of motion with towel under arm x 10   Doorway wall slides flexion, scaption x 10 with contralateral upper extremity  Assist to end range  -tactile cues for scapular stability with increased range of motion  reaching     Manual Therapy   - Shoulder Abduction: supine Left: passive ROM Reps: 10 Sets: 2  - Shoulder Extension:  supine Left: passive ROM Reps: 10 Sets: 2  - Shoulder Flexion:  supine Left: passive ROM Reps: 10 Sets: 2  - Shoulder External Rotation:  supine Left: passive ROM Reps: 10 Sets: 2  - Shoulder Internal Rotation:  supine Left: passive ROM Reps: 10 Sets: 2    Soft tissue mobilization and scar massage over incision sites on left shoulder  -tenderness to palpation and tension noted over deltoid tuberosity, proximal bicep, pec     Lateral distraction at glenohumeral joint grade 2-3   Mobilization with movement  Grade 1-2 inferior glenohumeral into flexion, horizontal abduction, external rotation    Grade 3 inferior, posterior glenohumeral glide     Skilled input: verbal instruction/cues, tactile instruction/cues, posture correction and facilitation    Writer verbally educated and received verbal consent for hand placement, positioning of patient, and techniques to be performed today from patient for clothing adjustments for techniques, hand placement and palpation for techniques and therapist position for techniques as described above and how they are pertinent to the patient's plan of care.  Home Exercise Program  Access Code: [email or web address]  URL: [email or web address]  Date: 06/02/2023  Prepared by: Jayna Pendleton    Exercises  - Seated Scapular Retraction  - 1 x daily - 7 x weekly - 3 sets - 10 reps  - Seated Forward Lean with Arm Hang   - 1 x daily - 7 x weekly - 3 sets - 30 hold  - Shoulder Flexion Wall Slide with Towel  - 1 x daily - 7 x weekly - 3 sets - 10 reps - 3 hold  - Scaption Wall Slide with Towel  - 1 x daily - 7 x weekly - 3 sets - 10 reps - 3 hold  - Doorway Pec Stretch at 60 Degrees Abduction with Arm Straight  - 1 x daily - 7 x weekly - 3 sets - 15-20 hold  - Standing Shoulder External Rotation Stretch in Doorway  - 1 x daily - 7 x weekly - 3 sets - 10 reps - 3 hold  -  Supine Shoulder Flexion Extension AAROM with Dowel  - 2 x daily - 7 x weekly - 3 sets - 10 reps  - Supine Shoulder Horizontal Abduction Adduction AAROM with Dowel  - 2 x daily - 7 x weekly - 3 sets - 10 reps  - Supine Shoulder External Rotation AAROM with Dowel  - 2 x daily - 7 x weekly - 3 sets - 10 reps  - Supine Shoulder Press with Dowel  - 2 x daily - 7 x weekly - 3 sets - 10 reps          ASSESSMENT                                                                                                            Continued slow but gradual increases noted in range of motion.  Better mobility with wall slides, however compensation noted with upper trap and scapular elevation to achieve full range of motion.  Scapular cues and strengthening planned for next session.  Still increased muscle guarding with passive range of motion impacting full mobility, will likely trial pulleys next session for mobilization on patient's control.  Patient would continue to benefit from skilled physical therapy services to address remaining limitations in functional mobility, strength, activity tolerance in order to return to prior level of function without increased pain or difficulty.    Pain/symptoms after session (out of 10): 2  Education:   - Results of above outlined education: Verbalizes understanding, Demonstrates understanding and Needs reinforcement    PLAN                                                                                                                           Suggestions for next session as indicated: Progress per plan of care    Pulleys   Scapular strengthening        Therapy procedure time and total treatment time can be found documented on the Time Entry flowsheet     Yes

## 2023-06-22 PROBLEM — Z00.00 ENCOUNTER FOR PREVENTIVE HEALTH EXAMINATION: Status: ACTIVE | Noted: 2023-06-22

## 2023-06-26 ENCOUNTER — APPOINTMENT (OUTPATIENT)
Dept: NEUROLOGY | Facility: CLINIC | Age: 38
End: 2023-06-26
Payer: COMMERCIAL

## 2023-06-26 VITALS
HEART RATE: 76 BPM | SYSTOLIC BLOOD PRESSURE: 119 MMHG | WEIGHT: 145 LBS | HEIGHT: 63 IN | DIASTOLIC BLOOD PRESSURE: 77 MMHG | BODY MASS INDEX: 25.69 KG/M2

## 2023-06-26 DIAGNOSIS — N12 TUBULO-INTERSTITIAL NEPHRITIS, NOT SPECIFIED AS ACUTE OR CHRONIC: ICD-10-CM

## 2023-06-26 DIAGNOSIS — Z78.9 OTHER SPECIFIED HEALTH STATUS: ICD-10-CM

## 2023-06-26 DIAGNOSIS — F90.9 ATTENTION-DEFICIT HYPERACTIVITY DISORDER, UNSPECIFIED TYPE: ICD-10-CM

## 2023-06-26 DIAGNOSIS — J90 PLEURAL EFFUSION, NOT ELSEWHERE CLASSIFIED: ICD-10-CM

## 2023-06-26 DIAGNOSIS — R20.2 PARESTHESIA OF SKIN: ICD-10-CM

## 2023-06-26 DIAGNOSIS — R42 DIZZINESS AND GIDDINESS: ICD-10-CM

## 2023-06-26 PROCEDURE — 99205 OFFICE O/P NEW HI 60 MIN: CPT

## 2023-06-26 RX ORDER — ELECTROLYTES/DEXTROSE
SOLUTION, ORAL ORAL
Refills: 0 | Status: ACTIVE | COMMUNITY

## 2023-06-26 RX ORDER — DEXTROAMPHETAMINE SACCHARATE, AMPHETAMINE ASPARTATE, DEXTROAMPHETAMINE SULFATE, AND AMPHETAMINE SULFATE 2.5; 2.5; 2.5; 2.5 MG/1; MG/1; MG/1; MG/1
10 TABLET ORAL
Refills: 0 | Status: ACTIVE | COMMUNITY

## 2023-06-26 RX ORDER — DEXTROAMPHETAMINE SACCHARATE, AMPHETAMINE ASPARTATE, DEXTROAMPHETAMINE SULFATE, AND AMPHETAMINE SULFATE 2.5; 2.5; 2.5; 2.5 MG/1; MG/1; MG/1; MG/1
10 TABLET ORAL
Refills: 0 | Status: COMPLETED | COMMUNITY

## 2023-06-26 RX ORDER — BUPROPION HCL 150 MG
150 TABLET,SUSTAINED-RELEASE 12 HR ORAL
Refills: 0 | Status: ACTIVE | COMMUNITY

## 2023-06-26 NOTE — ASSESSMENT
[FreeTextEntry1] : This is a case of a 37 yr right handed female with PMH of ADHD, Pleural effusion, polyneuritis presents today with headache.  \par Pt is to follow up with OBGY with family planning.  Pt will start magnesium and riboflavin with approval from OB. Imaging to r/o structural pathology. We also talked about trial NSAIDs for improvement, but to discontinue with pregnancy. \par \par \par Plan: \par   {  } MRI brain \par   {  } MRI cervical \par   {  } take with OBGY- about mag and riboflavin\par   {  } Continue with Tylenol\par   {  } Trial Motrin until pregnancy \par \par Headache education provided:\par [] Stay well hydrated\par [] Limit excessive caffeine and alcohol intake\par [] Maintain good sleep hygiene\par [] Try to avoid triggers\par [] Practice good eating habits\par [] Avoid excessive use of over the counter pain medications, as they can cause medication overuse headaches \par [] Keep a headache diary\par [] Relaxation techniques, biofeedback, massage therapy, acupunctures, and heating pads may be effective\par \par The above plan was discussed with Annika Whitaker in great detail.  Annika Whitaker verbalized understanding and agrees with plan as detailed above. Patient was provided education and counselling on current diagnosis/symptoms. She was advised to call our clinic at [phone] for any new or worsening symptoms, or with any questions or concerns. In case of acute onset of neurological symptoms or worsening presentation, patient was advised to present to nearest emergency room for further evaluation.  Annika Whitaker expressed understanding and all her questions/concerns were addressed.\par \par

## 2023-06-26 NOTE — HISTORY OF PRESENT ILLNESS
[FreeTextEntry1] : This is a case of a 37 yr right handed female with PMH of ADHD, Pleural effusion, polyneuritis presents today with headache.  \par \par Pt started in her 20's.  Pt would get headaches almost daily.  Pt can't remember much from location or description.  Pt denies n/v.  Denies light, sound, or smell.  Pt was dx with ADHD in her 20's as well and started on Adderall.  Pt stated headaches weren't as frequent after.\par Pt stated 2019, pt had headaches during 2nd pregnancy. During this pregnancy, she went to Er for loss of peripheral vision. Pt stated she had CT which was neg.  Pt was induced shortly after.  Pt was also seen by neuro md after (cant remember who she saw).  No MRI completed at that time. She stated she was told she was "fine".  Pt stated since then shes been having headaches sometimes 3-4 times a week.  Pt would have frontal/ temple pain.  Described as "nawing" throbbing".  Pt has some nausea but denies vomiting. Pt has sound sensitivity but not to light or smell. Pain 8/10.  Headaches will last from a couple hours to all day.  Pt treating with some relief.  Pt will take Tylenol 2 times a day "around the clock".   Pt will go to sleep to have relieve pain if Tylenol does not work.   Triggers noise, stress, children, work.  denies weakness in extremity, denies facial droop or slurred speech. Denies blurriness, double or loss or peripheral loss. Denies tinnitus or hearing loss.  pt will have some dizziness. Pt does have upper neck pain. Pt states she feels stiff. Pt will also state she gets a "shooting pain down 1 arm (left). Pt will have a "tingling" sensation which will only lasts a few seconds. \par Pt did have 3rd pregnancy which she stated was normal with a normal delivery. \par Pt was ALLISON positive (2021)- went to rheum and was worked up for RA and Lupus which was negative. \par Pt is currently family planning now for her fourth child.  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

## 2023-06-26 NOTE — PHYSICAL EXAM
[FreeTextEntry1] : Physical Exam\par \par Constitutional: Patient was well-developed, well-nourished and in no acute distress. \par \par Head: Normocephalic, atraumatic. \par \par Neck: Supple with full range of motion. \par \par Cardiovascular: Cardiac rhythm was regular without murmur. There were no carotid bruits. Peripheral pulses were full and symmetric. \par \par Respiratory: Lungs were clear. \par \par Abdomen: Soft and nontender. \par \par Spine: Nontender. \par \par Skin: There were no rashes. \par \par NEUROLOGICAL EXAMINATION:\par \par Mental Status: Patient was alert and oriented. Speech was fluent. There was no dysarthria. . Affect was normal.\par \par Cranial Nerves: \par \par II: Visual acuity was 20/20 bilaterally with near card. Pupils were equal and reactive. Visual fields were full. Funduscopic examination was normal. \par \par III, IV, VI: Eye movements were full without nystagmus. \par \par V: Facial sensation was intact. \par \par VII: Facial strength was normal. \par \par VIII: Hearing was equal. \par \par IX, X: Palatal movement was normal. Phonation was normal. \par \par XI: Sternocleidomastoids and trapezii were normal. \par \par XII: Tongue was midline and movements normal. There was no lingual atrophy or fasciculations. \par \par Motor Examination: Muscle bulk, tone and strength were normal. \par \par Sensory Examination:  vibration:  lower (ankles) 8 secs, upper (wrists) 11-12 secs  and joint position sense were intact. \par \par Reflexes: DTRs were 2+ throughout. \par \par Plantar Responses: Plantar responses were flexor. \par \par Gait/Stance: Gait and tandem were normal. Romberg was negative.\par

## 2023-08-01 ENCOUNTER — APPOINTMENT (OUTPATIENT)
Dept: INTERNAL MEDICINE | Facility: CLINIC | Age: 38
End: 2023-08-01

## 2023-08-03 NOTE — DISCHARGE NOTE OB - NS OB DC TDAP REASON NOT RECEIVED
[FreeTextEntry1] : Currently doing well. Denies chest pain, shortness of breath or palpitations. Physical activity sometimes limited by left knee and ankle. Contraindicated

## 2023-09-11 ENCOUNTER — NON-APPOINTMENT (OUTPATIENT)
Age: 38
End: 2023-09-11

## 2023-09-26 ENCOUNTER — APPOINTMENT (OUTPATIENT)
Dept: NEUROLOGY | Facility: CLINIC | Age: 38
End: 2023-09-26
Payer: COMMERCIAL

## 2023-09-26 VITALS
WEIGHT: 166 LBS | BODY MASS INDEX: 29.41 KG/M2 | HEART RATE: 70 BPM | SYSTOLIC BLOOD PRESSURE: 103 MMHG | DIASTOLIC BLOOD PRESSURE: 69 MMHG | HEIGHT: 63 IN

## 2023-09-26 DIAGNOSIS — M47.812 SPONDYLOSIS W/OUT MYELOPATHY OR RADICULOPATHY, CERVICAL REGION: ICD-10-CM

## 2023-09-26 DIAGNOSIS — R51.9 HEADACHE, UNSPECIFIED: ICD-10-CM

## 2023-09-26 DIAGNOSIS — M50.20 OTHER CERVICAL DISC DISPLACEMENT, UNSPECIFIED CERVICAL REGION: ICD-10-CM

## 2023-09-26 DIAGNOSIS — M54.2 CERVICALGIA: ICD-10-CM

## 2023-09-26 DIAGNOSIS — M54.12 RADICULOPATHY, CERVICAL REGION: ICD-10-CM

## 2023-09-26 PROCEDURE — 99214 OFFICE O/P EST MOD 30 MIN: CPT

## 2023-09-26 RX ORDER — MELOXICAM 7.5 MG/1
7.5 TABLET ORAL
Qty: 14 | Refills: 0 | Status: ACTIVE | COMMUNITY
Start: 2023-09-26 | End: 1900-01-01

## 2023-09-26 RX ORDER — TIZANIDINE 2 MG/1
2 TABLET ORAL
Qty: 21 | Refills: 0 | Status: ACTIVE | COMMUNITY
Start: 2023-09-26 | End: 1900-01-01

## 2023-11-13 NOTE — OB RN PATIENT PROFILE - PRO MENTAL HEALTH SX RECENT
Name: Bon Samuels  YOB: 1960  Gender: female  MRN: [id number]    CC: Right second toe/forefoot pain    HPI:   Summer 2023: Right forefoot pain. At least 4-month duration. No trauma  11/13/2023: Initial visit: Right second toe/forefoot pain    ROS/Meds/PSH/PMH/FH/SH: reviewed today    Tobacco:  reports that she has never smoked. She has never used smokeless tobacco.     Physical Examination:  Patient appears to be alert and oriented with acceptable appearance. No obvious distress or SOB  CV: appears to have acceptable vascular color and capillary refill  Neuro: appears to have mostly intact light touch sensation   Skin: Right second toe PIP thickening  MS: Standing: Right second claw toe: Gait full  Right = second claw toe MTP pain with stress testing; no gross instability; unreducible PIP    XR: Right: Standing AP lateral oblique foot taken today with right second claw toe; no evidence of AVN; long second metatarsal  XR Impression:  As above      Reviewed Test/Records/Documents: 10/21/2014: Treated for right plantar fasciitis  Injection: No indication today    Assessment:    Right second claw toe, metatarsal phalangeal joint synovitis, metatarsalgia    Plan:   The patient and I discussed the above assessment. We explored treatment options.      Unfortunately, she developed a right second claw toe as a result of MTP joint synovitis  Plan is to see if I can improve her pain and position but eventual surgery may be needed    Advanced medical imaging: No indication for MRI scan  DME: Dual action spacer  We discussed care and importance of protection  PT: She videotaped HEP MTP joint stretching  Orthotic/prosthetic: TF MTP pads: OOFOS shoes       Medication - OTC meds prn: Prescribed:  Before taking any pill or using topical medication, I recommend the patient review all listed medication potential side effects via the Internet or pharmacy-provided handout to ensure patient safety and to understand and none

## 2023-12-27 ENCOUNTER — APPOINTMENT (OUTPATIENT)
Dept: NEUROLOGY | Facility: CLINIC | Age: 38
End: 2023-12-27

## 2023-12-27 ENCOUNTER — NON-APPOINTMENT (OUTPATIENT)
Age: 38
End: 2023-12-27

## 2024-06-19 NOTE — ED ADULT TRIAGE NOTE - BSA (M2)
Pt called back stating [facility] no longer does compression stockings.    They informed her [facility] in [place] does.    Pt would like RX faxed to [facility].    Rx faxed to  [phone]  
Pt informed  
Pt's daughter called req an order for compression stockings with open toes to help with leg/feet edema during the extra hot days     [facility]    Please call Kailyn if any issues  
Rx done please let pt know.   
1.67

## 2024-09-05 NOTE — ED PROVIDER NOTE - TEMPLATE, MLM
Prenatal Vit-Fe Fumarate-FA (PRENATAL hCG, quantitative, pregnancy [[id number]]  ([id number]  4-5                         [id number]  5-6                         [id number]  6-8                         [id number]  8-12                        [id number]      Comprehensive metabolic panel [[id number]]  (1943 MUCUS THREADS(!): Moderate   1943 Bacteria, UA: Occasional   1943 pregnancy   Hypokalemia   Dehydration   First trimester pregnancy     pregnancy     9/5/2024  5:52 PM Calderon Rocha [E87.6] Hypokalemia     9/5/2024  7:49 PM Calderon Rocha [E86.0] Dehydration     9/5/2024  7:49 PM Calderon Rocha [Z34.91] First trimester pregnancy           [facility] [place] [address]  [phone] [facility] Emergency Department, [address] [place] [address], [id number]    [facility] OB/GYN [facility] [address] Obstetrics and Gynecology Schedule an appointment as soon as possible for a visit   [address]  [facility] [address]  [facility] [address]  [phone] [facility] OB/GYN [facility] [address], [facility] [address], [facility] [address], [place], [address],             Calderon Rocha PA-C  09/05/24 2004     General

## 2024-09-25 NOTE — ED ADULT NURSE NOTE - NS ED NURSE RECORD ANOTHER HT AND WT
Strep throat, rapid strep positive. Treat with antibiotics as prescribed.      No activities until 24 hours of antibiotics and fever resolution.     Gatito can take ibuprofen and acetaminophen for comfort and should push fluids.    Call if not improving over the next 2-3 days or with worsening symptoms.  
Yes

## 2025-04-14 PROBLEM — F41.9 ANXIETY DISORDER, UNSPECIFIED: Chronic | Status: ACTIVE | Noted: 2022-09-23

## 2025-04-16 ENCOUNTER — OUTPATIENT (OUTPATIENT)
Dept: OUTPATIENT SERVICES | Facility: HOSPITAL | Age: 40
LOS: 1 days | End: 2025-04-16
Payer: COMMERCIAL

## 2025-04-16 VITALS
TEMPERATURE: 98 F | SYSTOLIC BLOOD PRESSURE: 110 MMHG | OXYGEN SATURATION: 98 % | HEIGHT: 64 IN | RESPIRATION RATE: 14 BRPM | DIASTOLIC BLOOD PRESSURE: 45 MMHG | HEART RATE: 67 BPM | WEIGHT: 182.1 LBS

## 2025-04-16 DIAGNOSIS — O02.1 MISSED ABORTION: ICD-10-CM

## 2025-04-16 DIAGNOSIS — Z01.818 ENCOUNTER FOR OTHER PREPROCEDURAL EXAMINATION: ICD-10-CM

## 2025-04-16 DIAGNOSIS — Z86.018 PERSONAL HISTORY OF OTHER BENIGN NEOPLASM: Chronic | ICD-10-CM

## 2025-04-16 DIAGNOSIS — Z98.890 OTHER SPECIFIED POSTPROCEDURAL STATES: Chronic | ICD-10-CM

## 2025-04-16 LAB
HCG SERPL-ACNC: HIGH MIU/ML
HCT VFR BLD CALC: 37.4 % — SIGNIFICANT CHANGE UP (ref 34.5–45)
HGB BLD-MCNC: 13.5 G/DL — SIGNIFICANT CHANGE UP (ref 11.5–15.5)
MCHC RBC-ENTMCNC: 32.5 PG — SIGNIFICANT CHANGE UP (ref 27–34)
MCHC RBC-ENTMCNC: 36.1 G/DL — HIGH (ref 32–36)
MCV RBC AUTO: 90.1 FL — SIGNIFICANT CHANGE UP (ref 80–100)
NRBC BLD AUTO-RTO: 0 /100 WBCS — SIGNIFICANT CHANGE UP (ref 0–0)
PLATELET # BLD AUTO: 210 K/UL — SIGNIFICANT CHANGE UP (ref 150–400)
RBC # BLD: 4.15 M/UL — SIGNIFICANT CHANGE UP (ref 3.8–5.2)
RBC # FLD: 12 % — SIGNIFICANT CHANGE UP (ref 10.3–14.5)
WBC # BLD: 9.58 K/UL — SIGNIFICANT CHANGE UP (ref 3.8–10.5)
WBC # FLD AUTO: 9.58 K/UL — SIGNIFICANT CHANGE UP (ref 3.8–10.5)

## 2025-04-16 PROCEDURE — 36415 COLL VENOUS BLD VENIPUNCTURE: CPT

## 2025-04-16 PROCEDURE — 86900 BLOOD TYPING SEROLOGIC ABO: CPT

## 2025-04-16 PROCEDURE — 86901 BLOOD TYPING SEROLOGIC RH(D): CPT

## 2025-04-16 PROCEDURE — 86850 RBC ANTIBODY SCREEN: CPT

## 2025-04-16 PROCEDURE — 85027 COMPLETE CBC AUTOMATED: CPT

## 2025-04-16 PROCEDURE — G0463: CPT

## 2025-04-16 PROCEDURE — 84702 CHORIONIC GONADOTROPIN TEST: CPT

## 2025-04-16 NOTE — H&P PST ADULT - NSICDXPASTMEDICALHX_GEN_ALL_CORE_FT
PAST MEDICAL HISTORY:  2019 novel coronavirus disease (COVID-19)     ADHD     Anxiety     Depression Hx of xanax and lexapro - d/c meds 5 years ago    Herniated cervical disc     History of abnormal Pap smear     Migraines     Missed       (normal spontaneous vaginal delivery) 2017 (FT F) & 2019 (FT F - hx of possible TIA)    SAB (spontaneous ) without D&C

## 2025-04-16 NOTE — H&P PST ADULT - HISTORY OF PRESENT ILLNESS
39 year old female  ( notes 1 prior missed AB in 2018 and now 2nd missed AB),  PMH Possible TIA ( while pregnant with 2nd child pt notes Neuro feels symptoms were due to a Migraine; no residual side effects), H/O Migraines, ADHD, Anxiety & Depression ( no current medications), H/O Abnormal Pap, Ovarian Cyst, Herniated Cervical Disc, COVID-19;  now with Missed  presents today for PST prior to Suction Dilation & Curettage With Karotyping with Dr Kline on 2025.     Pt notes LMP was in 2025 she was diagnosed pregnant. pt notes she was seen at GYN office for routine check-up. Notes she underwent abdominal sonogram as well as transvaginal sonogram and no fetal heart rate was detected. Following discussions with Dr Kline regarding treatment options pt is electing for scheduled procedure.

## 2025-04-16 NOTE — H&P PST ADULT - NSICDXFAMILYHX_GEN_ALL_CORE_FT
FAMILY HISTORY:  Father  Still living? Yes, Estimated age: 71-80  FHx: rheumatoid arthritis, Age at diagnosis: Age Unknown

## 2025-04-16 NOTE — H&P PST ADULT - ASSESSMENT
39 year old female  ( notes 1 prior missed AB in 2018 and now 2nd missed AB),  PMH Possible TIA ( while pregnant with 2nd child pt notes Neuro feels symptoms were due to a Migraine; no residual side effects), H/O Migraines, ADHD, Anxiety & Depression ( no current medications), H/O Abnormal Pap, Ovarian Cyst, Herniated Cervical Disc, COVID-19;  now with Missed  presents today for PST prior to Suction Dilation & Curettage With Karotyping with Dr Kline on 2025.

## 2025-04-16 NOTE — H&P PST ADULT - PROBLEM SELECTOR PLAN 1
PST Labs; CBC, HcG, Type & Screen. No medical clearance needed as per Dr Kline/PST. Pt instructed to stop any NSAIDS/Herbal Supplements between now and procedure. May take Tylenol if needed for any pain between now and procedure. No medications needed morning of procedure. Pre-op instructions given to pt with understanding verbalized. All questions addressed with pt prior to her leaving the PST department today.

## 2025-04-16 NOTE — H&P PST ADULT - NEGATIVE GENERAL SYMPTOMS
notes prior H/O COVID - HAS had prior covid vaccines/no fever/no chills/no sweating Notes prior H/O COVID - HAS had prior covid vaccines/no fever/no chills/no sweating

## 2025-04-17 PROBLEM — Z86.59 PERSONAL HISTORY OF OTHER MENTAL AND BEHAVIORAL DISORDERS: Chronic | Status: INACTIVE | Noted: 2022-09-23 | Resolved: 2025-04-16

## 2025-04-17 NOTE — ASU PATIENT PROFILE, ADULT - TEACHING/LEARNING FACTORS INFLUENCE READINESS TO LEARN
S: The patient is here today for wound/scar check. The patient had Mohs surgery located on the upper forehead with Intermediate layered closure repair, 3 week(s) ago. The patient c/o bump at lateral edge. No drainage. EXAM: linear surgical wound without erythema, fluctuance or spitting suture identified    IMPRESSION/PLAN:  Likely subcut suture knot where she is feeling a small bump. Reassured this will resolve with time as the suture dissolves. If she can see a piece of suture coming from wound to return.   No issues - continue to monitor none

## 2025-04-18 ENCOUNTER — OUTPATIENT (OUTPATIENT)
Dept: OUTPATIENT SERVICES | Facility: HOSPITAL | Age: 40
LOS: 1 days | End: 2025-04-18
Payer: COMMERCIAL

## 2025-04-18 ENCOUNTER — TRANSCRIPTION ENCOUNTER (OUTPATIENT)
Age: 40
End: 2025-04-18

## 2025-04-18 VITALS
RESPIRATION RATE: 14 BRPM | OXYGEN SATURATION: 100 % | HEART RATE: 72 BPM | DIASTOLIC BLOOD PRESSURE: 64 MMHG | SYSTOLIC BLOOD PRESSURE: 109 MMHG

## 2025-04-18 VITALS
SYSTOLIC BLOOD PRESSURE: 98 MMHG | OXYGEN SATURATION: 100 % | TEMPERATURE: 98 F | DIASTOLIC BLOOD PRESSURE: 69 MMHG | RESPIRATION RATE: 18 BRPM | HEART RATE: 66 BPM

## 2025-04-18 DIAGNOSIS — Z86.018 PERSONAL HISTORY OF OTHER BENIGN NEOPLASM: Chronic | ICD-10-CM

## 2025-04-18 DIAGNOSIS — Z98.890 OTHER SPECIFIED POSTPROCEDURAL STATES: Chronic | ICD-10-CM

## 2025-04-18 DIAGNOSIS — O02.1 MISSED ABORTION: ICD-10-CM

## 2025-04-18 PROCEDURE — 59820 CARE OF MISCARRIAGE: CPT

## 2025-04-18 PROCEDURE — 88280 CHROMOSOME KARYOTYPE STUDY: CPT

## 2025-04-18 PROCEDURE — 88305 TISSUE EXAM BY PATHOLOGIST: CPT

## 2025-04-18 PROCEDURE — 88305 TISSUE EXAM BY PATHOLOGIST: CPT | Mod: 26

## 2025-04-18 PROCEDURE — 88233 TISSUE CULTURE SKIN/BIOPSY: CPT

## 2025-04-18 PROCEDURE — 88300 SURGICAL PATH GROSS: CPT

## 2025-04-18 PROCEDURE — 76998 US GUIDE INTRAOP: CPT

## 2025-04-18 PROCEDURE — 88291 CYTO/MOLECULAR REPORT: CPT

## 2025-04-18 PROCEDURE — 88264 CHROMOSOME ANALYSIS 20-25: CPT

## 2025-04-18 PROCEDURE — 88300 SURGICAL PATH GROSS: CPT | Mod: 26,59

## 2025-04-18 RX ORDER — SODIUM CHLORIDE 9 G/1000ML
1000 INJECTION, SOLUTION INTRAVENOUS
Refills: 0 | Status: DISCONTINUED | OUTPATIENT
Start: 2025-04-18 | End: 2025-04-18

## 2025-04-18 RX ORDER — ONDANSETRON HCL/PF 4 MG/2 ML
4 VIAL (ML) INJECTION ONCE
Refills: 0 | Status: DISCONTINUED | OUTPATIENT
Start: 2025-04-18 | End: 2025-04-18

## 2025-04-18 RX ORDER — HYDROMORPHONE/SOD CHLOR,ISO/PF 2 MG/10 ML
0.5 SYRINGE (ML) INJECTION
Refills: 0 | Status: DISCONTINUED | OUTPATIENT
Start: 2025-04-18 | End: 2025-04-18

## 2025-04-18 RX ORDER — DOXYCYCLINE HYCLATE 100 MG
200 TABLET ORAL ONCE
Refills: 0 | Status: DISCONTINUED | OUTPATIENT
Start: 2025-04-18 | End: 2025-04-18

## 2025-04-18 RX ORDER — PRENATAL 136/IRON/FOLIC ACID 27 MG-1 MG
1 TABLET ORAL
Refills: 0 | DISCHARGE

## 2025-04-18 RX ORDER — ACETAMINOPHEN 500 MG/5ML
1000 LIQUID (ML) ORAL ONCE
Refills: 0 | Status: DISCONTINUED | OUTPATIENT
Start: 2025-04-18 | End: 2025-04-18

## 2025-04-18 RX ADMIN — SODIUM CHLORIDE 75 MILLILITER(S): 9 INJECTION, SOLUTION INTRAVENOUS at 09:27

## 2025-04-18 NOTE — ASU DISCHARGE PLAN (ADULT/PEDIATRIC) - CARE PROVIDER_API CALL
Mode Kline  Obstetrics and Gynecology  [address] [place] [id number]  Phone: [phone]  Fax: [phone]  Follow Up Time: 2 weeks

## 2025-04-18 NOTE — ASU DISCHARGE PLAN (ADULT/PEDIATRIC) - NS MD DC FALL RISK RISK
For information on Fall & Injury Prevention, visit: [email or web address] OR  [email or web address] OR  https://www.cdc.gov/steadi/patient.html

## 2025-04-18 NOTE — ASU DISCHARGE PLAN (ADULT/PEDIATRIC) - OK TO LEAVE MESSAGE ON VOICEMAIL
Pt ambulated around unit without difficulty.  Right groin dressing remains clean dry and intact.  Right groin soft.  No bleeding or hematoma noted.  No c/o chest pain, pain or SOB at present.  Pt remains on EKG monitor awaiting d/c orders and visit from Dr. Milan.  Pt friend called to bedside.   Yes

## 2025-04-18 NOTE — ASU DISCHARGE PLAN (ADULT/PEDIATRIC) - FINANCIAL ASSISTANCE
[facility] provides services at a reduced cost to those who are determined to be eligible through [facility]’s financial assistance program. Information regarding [facility]’s financial assistance program can be found by going to [email or web address] or by calling [phone].

## 2025-04-18 NOTE — BRIEF OPERATIVE NOTE - NSICDXBRIEFPROCEDURE_GEN_ALL_CORE_FT
PROCEDURES:  Dilation and curettage, uterus, using suction, with US guidance 18-Apr-2025 11:01:28  Mode Kline

## 2025-04-22 ENCOUNTER — APPOINTMENT (OUTPATIENT)
Dept: ANTEPARTUM | Facility: CLINIC | Age: 40
End: 2025-04-22

## (undated) DEVICE — GLV 5.5 PROTEXIS (WHITE)

## (undated) DEVICE — SPECIMEN CONTAINER 4OZ

## (undated) DEVICE — SOL IRR POUR H2O 1000ML

## (undated) DEVICE — VENODYNE/SCD SLEEVE CALF MEDIUM

## (undated) DEVICE — TUBING SUCTION ASPIRATION 8FT

## (undated) DEVICE — PREP TRAY DRY SKIN PREP SCRUB

## (undated) DEVICE — SOL IRR POUR NS 0.9% 1000ML

## (undated) DEVICE — VACUUM CURETTE BERKLEY OLYMPUS CURVED 8MM

## (undated) DEVICE — PACK LITHOTOMY

## (undated) DEVICE — VENODYNE/SCD SLEEVE CALF LARGE

## (undated) DEVICE — WARMING BLANKET FULL ADULT

## (undated) DEVICE — PSP-SCD MACHINE: Type: DURABLE MEDICAL EQUIPMENT

## (undated) DEVICE — VACUUM CURETTE BUSSE HOSP CURVED 9MM